# Patient Record
Sex: MALE | Race: WHITE | Employment: PART TIME | URBAN - METROPOLITAN AREA
[De-identification: names, ages, dates, MRNs, and addresses within clinical notes are randomized per-mention and may not be internally consistent; named-entity substitution may affect disease eponyms.]

---

## 2018-11-12 ENCOUNTER — TELEPHONE (OUTPATIENT)
Dept: ADMINISTRATIVE | Age: 33
End: 2018-11-12

## 2024-11-18 NOTE — PROGRESS NOTES
atraumatic.   Eyes:      General:         Right eye: No discharge.         Left eye: No discharge.      Conjunctiva/sclera: Conjunctivae normal.   Neck:      Trachea: No tracheal deviation.   Cardiovascular:      Rate and Rhythm: Normal rate and regular rhythm.      Heart sounds: Normal heart sounds.   Pulmonary:      Effort: Pulmonary effort is normal. No respiratory distress.      Breath sounds: Normal breath sounds. No wheezing.   Abdominal:      General: Bowel sounds are normal. There is no distension.      Palpations: Abdomen is soft.      Tenderness: There is no abdominal tenderness.   Musculoskeletal:         General: No tenderness.      Cervical back: Normal range of motion and neck supple.   Skin:     General: Skin is warm and dry.   Neurological:      Mental Status: He is alert.   Psychiatric:         Behavior: Behavior normal.             Assessment and Plan       1. Generalized abdominal pain  Chronic issue  -Unclear if secondary to previous surgery, however issues started after this, responding to carafate, hycosamine, and pepcid his cousin gave him, will start there, given long standing issue will get repeat imaging and refer to general surgery  - Iglesia Bruner MD, General Surgery, [place]  - CBC  - Comprehensive Metabolic Panel  - TSH  - famotidine (PEPCID) 20 MG tablet; Take 1 tablet by mouth 2 times daily  Dispense: 60 tablet; Refill: 3  - hyoscyamine (LEVSIN) 0.125 MG tablet; Take 1 tablet by mouth every 4 hours as needed for Cramping  Dispense: 30 tablet; Refill: 3  - sucralfate (CARAFATE) 1 GM tablet; Take 1 tablet by mouth 4 times daily  Dispense: 120 tablet; Refill: 3  - CT ABDOMEN PELVIS WO CONTRAST Additional Contrast? None; Future    2. Depressed mood  New issue?  -Secondary to abdominal issues, will treat this first and watch mood, denies any HI or SI   - CBC  - Comprehensive Metabolic Panel  - TSH      I am the primary care provider for this patient and provide the patient

## 2024-11-19 ENCOUNTER — TELEPHONE (OUTPATIENT)
Dept: FAMILY MEDICINE CLINIC | Age: 39
End: 2024-11-19

## 2024-11-19 ENCOUNTER — OFFICE VISIT (OUTPATIENT)
Dept: PRIMARY CARE CLINIC | Age: 39
End: 2024-11-19

## 2024-11-19 VITALS
HEART RATE: 74 BPM | HEIGHT: 76 IN | WEIGHT: 199 LBS | SYSTOLIC BLOOD PRESSURE: 122 MMHG | RESPIRATION RATE: 18 BRPM | OXYGEN SATURATION: 100 % | DIASTOLIC BLOOD PRESSURE: 60 MMHG | BODY MASS INDEX: 24.23 KG/M2 | TEMPERATURE: 97.5 F

## 2024-11-19 DIAGNOSIS — R10.84 GENERALIZED ABDOMINAL PAIN: Primary | ICD-10-CM

## 2024-11-19 DIAGNOSIS — R45.89 DEPRESSED MOOD: ICD-10-CM

## 2024-11-19 LAB
ALBUMIN: 4.3 G/DL (ref 3.5–5.2)
ALP BLD-CCNC: 54 U/L (ref 40–129)
ALT SERPL-CCNC: 17 U/L (ref 0–40)
ANION GAP SERPL CALCULATED.3IONS-SCNC: 10 MMOL/L (ref 7–16)
AST SERPL-CCNC: 17 U/L (ref 0–39)
BILIRUB SERPL-MCNC: 0.3 MG/DL (ref 0–1.2)
BUN BLDV-MCNC: 11 MG/DL (ref 6–20)
CALCIUM SERPL-MCNC: 8.6 MG/DL (ref 8.6–10.2)
CHLORIDE BLD-SCNC: 103 MMOL/L (ref 98–107)
CO2: 24 MMOL/L (ref 22–29)
CREAT SERPL-MCNC: 1.1 MG/DL (ref 0.7–1.2)
GFR, ESTIMATED: >90 ML/MIN/1.73M2
GLUCOSE BLD-MCNC: 90 MG/DL (ref 74–99)
POTASSIUM SERPL-SCNC: 3.7 MMOL/L (ref 3.5–5)
SODIUM BLD-SCNC: 137 MMOL/L (ref 132–146)
TOTAL PROTEIN: 6.6 G/DL (ref 6.4–8.3)
TSH SERPL DL<=0.05 MIU/L-ACNC: 0.8 UIU/ML (ref 0.27–4.2)

## 2024-11-19 RX ORDER — SUCRALFATE 1 G/1
1 TABLET ORAL 4 TIMES DAILY
Qty: 120 TABLET | Refills: 3 | Status: SHIPPED | OUTPATIENT
Start: 2024-11-19

## 2024-11-19 RX ORDER — FAMOTIDINE 20 MG/1
20 TABLET, FILM COATED ORAL 2 TIMES DAILY
Qty: 60 TABLET | Refills: 3 | Status: ON HOLD
Start: 2024-11-19 | End: 2024-11-22 | Stop reason: HOSPADM

## 2024-11-19 RX ORDER — HYOSCYAMINE SULFATE 0.125 MG
0.12 TABLET ORAL EVERY 4 HOURS PRN
Qty: 30 TABLET | Refills: 3 | Status: SHIPPED | OUTPATIENT
Start: 2024-11-19

## 2024-11-19 SDOH — ECONOMIC STABILITY: FOOD INSECURITY: WITHIN THE PAST 12 MONTHS, YOU WORRIED THAT YOUR FOOD WOULD RUN OUT BEFORE YOU GOT MONEY TO BUY MORE.: NEVER TRUE

## 2024-11-19 SDOH — ECONOMIC STABILITY: FOOD INSECURITY: WITHIN THE PAST 12 MONTHS, THE FOOD YOU BOUGHT JUST DIDN'T LAST AND YOU DIDN'T HAVE MONEY TO GET MORE.: NEVER TRUE

## 2024-11-19 SDOH — HEALTH STABILITY: PHYSICAL HEALTH: ON AVERAGE, HOW MANY DAYS PER WEEK DO YOU ENGAGE IN MODERATE TO STRENUOUS EXERCISE (LIKE A BRISK WALK)?: 5 DAYS

## 2024-11-19 SDOH — ECONOMIC STABILITY: INCOME INSECURITY: HOW HARD IS IT FOR YOU TO PAY FOR THE VERY BASICS LIKE FOOD, HOUSING, MEDICAL CARE, AND HEATING?: NOT HARD AT ALL

## 2024-11-19 ASSESSMENT — PATIENT HEALTH QUESTIONNAIRE - PHQ9
4. FEELING TIRED OR HAVING LITTLE ENERGY: NEARLY EVERY DAY
SUM OF ALL RESPONSES TO PHQ QUESTIONS 1-9: 12
8. MOVING OR SPEAKING SO SLOWLY THAT OTHER PEOPLE COULD HAVE NOTICED. OR THE OPPOSITE, BEING SO FIGETY OR RESTLESS THAT YOU HAVE BEEN MOVING AROUND A LOT MORE THAN USUAL: NOT AT ALL
9. THOUGHTS THAT YOU WOULD BE BETTER OFF DEAD, OR OF HURTING YOURSELF: NOT AT ALL
1. LITTLE INTEREST OR PLEASURE IN DOING THINGS: NEARLY EVERY DAY
5. POOR APPETITE OR OVEREATING: NOT AT ALL
3. TROUBLE FALLING OR STAYING ASLEEP: NEARLY EVERY DAY
SUM OF ALL RESPONSES TO PHQ9 QUESTIONS 1 & 2: 4
6. FEELING BAD ABOUT YOURSELF - OR THAT YOU ARE A FAILURE OR HAVE LET YOURSELF OR YOUR FAMILY DOWN: MORE THAN HALF THE DAYS
SUM OF ALL RESPONSES TO PHQ QUESTIONS 1-9: 12
7. TROUBLE CONCENTRATING ON THINGS, SUCH AS READING THE NEWSPAPER OR WATCHING TELEVISION: NOT AT ALL
SUM OF ALL RESPONSES TO PHQ QUESTIONS 1-9: 12
SUM OF ALL RESPONSES TO PHQ QUESTIONS 1-9: 12
2. FEELING DOWN, DEPRESSED OR HOPELESS: SEVERAL DAYS
10. IF YOU CHECKED OFF ANY PROBLEMS, HOW DIFFICULT HAVE THESE PROBLEMS MADE IT FOR YOU TO DO YOUR WORK, TAKE CARE OF THINGS AT HOME, OR GET ALONG WITH OTHER PEOPLE: NOT DIFFICULT AT ALL

## 2024-11-20 ENCOUNTER — HOSPITAL ENCOUNTER (INPATIENT)
Age: 39
LOS: 2 days | Discharge: HOME | DRG: 663 | End: 2024-11-22
Attending: EMERGENCY MEDICINE | Admitting: FAMILY MEDICINE
Payer: COMMERCIAL

## 2024-11-20 ENCOUNTER — APPOINTMENT (OUTPATIENT)
Dept: CT IMAGING | Age: 39
DRG: 663 | End: 2024-11-20
Payer: COMMERCIAL

## 2024-11-20 DIAGNOSIS — K92.2 ACUTE UPPER GI BLEED: ICD-10-CM

## 2024-11-20 DIAGNOSIS — D64.9 ANEMIA, UNSPECIFIED TYPE: Primary | ICD-10-CM

## 2024-11-20 PROBLEM — D62 ACUTE BLOOD LOSS ANEMIA (ABLA): Status: ACTIVE | Noted: 2024-11-20

## 2024-11-20 LAB
ALBUMIN SERPL-MCNC: 4.5 G/DL (ref 3.5–5.2)
ALP SERPL-CCNC: 58 U/L (ref 40–129)
ALT SERPL-CCNC: 18 U/L (ref 0–40)
ANION GAP SERPL CALCULATED.3IONS-SCNC: 7 MMOL/L (ref 7–16)
AST SERPL-CCNC: 20 U/L (ref 0–39)
ATYPICAL LYMPHOCYTE ABSOLUTE COUNT: 0.03 K/UL (ref 0–0.46)
ATYPICAL LYMPHOCYTES: 1 % (ref 0–4)
BASOPHILS # BLD: 0.11 K/UL (ref 0–0.2)
BASOPHILS NFR BLD: 4 % (ref 0–2)
BILIRUB SERPL-MCNC: 0.3 MG/DL (ref 0–1.2)
BUN SERPL-MCNC: 9 MG/DL (ref 6–20)
CALCIUM SERPL-MCNC: 8.7 MG/DL (ref 8.6–10.2)
CHLORIDE SERPL-SCNC: 103 MMOL/L (ref 98–107)
CO2 SERPL-SCNC: 25 MMOL/L (ref 22–29)
CREAT SERPL-MCNC: 0.9 MG/DL (ref 0.7–1.2)
EKG ATRIAL RATE: 64 BPM
EKG P AXIS: 54 DEGREES
EKG P-R INTERVAL: 150 MS
EKG Q-T INTERVAL: 390 MS
EKG QRS DURATION: 98 MS
EKG QTC CALCULATION (BAZETT): 402 MS
EKG R AXIS: 75 DEGREES
EKG T AXIS: 70 DEGREES
EKG VENTRICULAR RATE: 64 BPM
EOSINOPHIL # BLD: 0.08 K/UL (ref 0.05–0.5)
EOSINOPHILS RELATIVE PERCENT: 3 % (ref 0–6)
ERYTHROCYTE [DISTWIDTH] IN BLOOD BY AUTOMATED COUNT: 21 % (ref 11.5–15)
FOLATE SERPL-MCNC: >20 NG/ML (ref 4.8–24.2)
GFR, ESTIMATED: >90 ML/MIN/1.73M2
GLUCOSE SERPL-MCNC: 93 MG/DL (ref 74–99)
HCT VFR BLD AUTO: 16.1 % (ref 37–54)
HCT VFR BLD AUTO: 24 % (ref 37–54)
HCT VFR BLD CALC: 16 % (ref 37–54)
HEMOGLOBIN: 3.6 G/DL (ref 12.5–16.5)
HGB BLD-MCNC: 3.8 G/DL (ref 12.5–16.5)
HGB BLD-MCNC: 6.8 G/DL (ref 12.5–16.5)
LACTATE BLDV-SCNC: 0.9 MMOL/L (ref 0.5–2.2)
LIPASE SERPL-CCNC: 11 U/L (ref 13–60)
LYMPHOCYTES NFR BLD: 0.66 K/UL (ref 1.5–4)
LYMPHOCYTES RELATIVE PERCENT: 22 % (ref 20–42)
MAGNESIUM SERPL-MCNC: 2 MG/DL (ref 1.6–2.6)
MCH RBC QN AUTO: 12.8 PG (ref 26–35)
MCH RBC QN AUTO: 13 PG (ref 26–35)
MCHC RBC AUTO-ENTMCNC: 22.5 G/DL (ref 32–34.5)
MCHC RBC AUTO-ENTMCNC: 23.6 G/DL (ref 32–34.5)
MCV RBC AUTO: 55.1 FL (ref 80–99.9)
MCV RBC AUTO: 56.9 FL (ref 80–99.9)
MONOCYTES NFR BLD: 0.08 K/UL (ref 0.1–0.95)
MONOCYTES NFR BLD: 3 % (ref 2–12)
NEUTROPHILS NFR BLD: 68 % (ref 43–80)
NEUTS SEG NFR BLD: 2.05 K/UL (ref 1.8–7.3)
NUCLEATED RED BLOOD CELLS: 1 PER 100 WBC
PDW BLD-RTO: 21.2 % (ref 11.5–15)
PLATELET # BLD: 246 K/UL (ref 130–450)
PLATELET CONFIRMATION: NORMAL
PLATELET, FLUORESCENCE: 256 K/UL (ref 130–450)
PMV BLD AUTO: ABNORMAL FL (ref 7–12)
PMV BLD AUTO: ABNORMAL FL (ref 7–12)
POTASSIUM SERPL-SCNC: 3.9 MMOL/L (ref 3.5–5)
PROT SERPL-MCNC: 6.4 G/DL (ref 6.4–8.3)
RBC # BLD AUTO: 2.92 M/UL (ref 3.8–5.8)
RBC # BLD: 2.81 M/UL (ref 3.8–5.8)
RBC # BLD: ABNORMAL 10*6/UL
SODIUM SERPL-SCNC: 135 MMOL/L (ref 132–146)
TROPONIN I SERPL HS-MCNC: <6 NG/L (ref 0–11)
TROPONIN I SERPL HS-MCNC: <6 NG/L (ref 0–11)
TSH SERPL DL<=0.05 MIU/L-ACNC: 1.3 UIU/ML (ref 0.27–4.2)
VIT B12 SERPL-MCNC: 293 PG/ML (ref 211–946)
WBC # BLD: 4.3 K/UL (ref 4.5–11.5)
WBC OTHER # BLD: 3 K/UL (ref 4.5–11.5)

## 2024-11-20 PROCEDURE — 84484 ASSAY OF TROPONIN QUANT: CPT

## 2024-11-20 PROCEDURE — 80053 COMPREHEN METABOLIC PANEL: CPT

## 2024-11-20 PROCEDURE — 85018 HEMOGLOBIN: CPT

## 2024-11-20 PROCEDURE — 6360000004 HC RX CONTRAST MEDICATION: Performed by: RADIOLOGY

## 2024-11-20 PROCEDURE — 84443 ASSAY THYROID STIM HORMONE: CPT

## 2024-11-20 PROCEDURE — 93010 ELECTROCARDIOGRAM REPORT: CPT | Performed by: INTERNAL MEDICINE

## 2024-11-20 PROCEDURE — 96374 THER/PROPH/DIAG INJ IV PUSH: CPT

## 2024-11-20 PROCEDURE — 82607 VITAMIN B-12: CPT

## 2024-11-20 PROCEDURE — 83540 ASSAY OF IRON: CPT

## 2024-11-20 PROCEDURE — 2580000003 HC RX 258

## 2024-11-20 PROCEDURE — 83605 ASSAY OF LACTIC ACID: CPT

## 2024-11-20 PROCEDURE — P9016 RBC LEUKOCYTES REDUCED: HCPCS

## 2024-11-20 PROCEDURE — 93005 ELECTROCARDIOGRAM TRACING: CPT

## 2024-11-20 PROCEDURE — 99285 EMERGENCY DEPT VISIT HI MDM: CPT

## 2024-11-20 PROCEDURE — 83550 IRON BINDING TEST: CPT

## 2024-11-20 PROCEDURE — 85025 COMPLETE CBC W/AUTO DIFF WBC: CPT

## 2024-11-20 PROCEDURE — 2060000000 HC ICU INTERMEDIATE R&B

## 2024-11-20 PROCEDURE — 82746 ASSAY OF FOLIC ACID SERUM: CPT

## 2024-11-20 PROCEDURE — 36430 TRANSFUSION BLD/BLD COMPNT: CPT

## 2024-11-20 PROCEDURE — 82728 ASSAY OF FERRITIN: CPT

## 2024-11-20 PROCEDURE — 85014 HEMATOCRIT: CPT

## 2024-11-20 PROCEDURE — 83690 ASSAY OF LIPASE: CPT

## 2024-11-20 PROCEDURE — 6360000002 HC RX W HCPCS

## 2024-11-20 PROCEDURE — 6360000002 HC RX W HCPCS: Performed by: EMERGENCY MEDICINE

## 2024-11-20 PROCEDURE — 74174 CTA ABD&PLVS W/CONTRAST: CPT

## 2024-11-20 PROCEDURE — 96375 TX/PRO/DX INJ NEW DRUG ADDON: CPT

## 2024-11-20 PROCEDURE — 86923 COMPATIBILITY TEST ELECTRIC: CPT

## 2024-11-20 PROCEDURE — 86901 BLOOD TYPING SEROLOGIC RH(D): CPT

## 2024-11-20 PROCEDURE — 30233N1 TRANSFUSION OF NONAUTOLOGOUS RED BLOOD CELLS INTO PERIPHERAL VEIN, PERCUTANEOUS APPROACH: ICD-10-PCS | Performed by: STUDENT IN AN ORGANIZED HEALTH CARE EDUCATION/TRAINING PROGRAM

## 2024-11-20 PROCEDURE — 86900 BLOOD TYPING SEROLOGIC ABO: CPT

## 2024-11-20 PROCEDURE — 83735 ASSAY OF MAGNESIUM: CPT

## 2024-11-20 PROCEDURE — 86850 RBC ANTIBODY SCREEN: CPT

## 2024-11-20 RX ORDER — SODIUM CHLORIDE 0.9 % (FLUSH) 0.9 %
5-40 SYRINGE (ML) INJECTION PRN
Status: DISCONTINUED | OUTPATIENT
Start: 2024-11-20 | End: 2024-11-22 | Stop reason: HOSPADM

## 2024-11-20 RX ORDER — IOPAMIDOL 755 MG/ML
75 INJECTION, SOLUTION INTRAVASCULAR
Status: COMPLETED | OUTPATIENT
Start: 2024-11-20 | End: 2024-11-20

## 2024-11-20 RX ORDER — SODIUM CHLORIDE 9 MG/ML
INJECTION, SOLUTION INTRAVENOUS PRN
Status: COMPLETED | OUTPATIENT
Start: 2024-11-20 | End: 2024-11-20

## 2024-11-20 RX ORDER — PANTOPRAZOLE SODIUM 40 MG/10ML
40 INJECTION, POWDER, LYOPHILIZED, FOR SOLUTION INTRAVENOUS 2 TIMES DAILY
Status: DISCONTINUED | OUTPATIENT
Start: 2024-11-20 | End: 2024-11-22 | Stop reason: HOSPADM

## 2024-11-20 RX ORDER — FUROSEMIDE 10 MG/ML
20 INJECTION INTRAMUSCULAR; INTRAVENOUS ONCE
Status: COMPLETED | OUTPATIENT
Start: 2024-11-20 | End: 2024-11-20

## 2024-11-20 RX ORDER — CALCIUM GLUCONATE 94 MG/ML
1000 INJECTION, SOLUTION INTRAVENOUS ONCE
Status: COMPLETED | OUTPATIENT
Start: 2024-11-20 | End: 2024-11-20

## 2024-11-20 RX ORDER — SODIUM CHLORIDE 9 MG/ML
INJECTION, SOLUTION INTRAVENOUS PRN
Status: DISCONTINUED | OUTPATIENT
Start: 2024-11-20 | End: 2024-11-22 | Stop reason: HOSPADM

## 2024-11-20 RX ORDER — ACETAMINOPHEN 325 MG/1
650 TABLET ORAL EVERY 6 HOURS PRN
Status: DISCONTINUED | OUTPATIENT
Start: 2024-11-20 | End: 2024-11-22 | Stop reason: HOSPADM

## 2024-11-20 RX ORDER — POLYETHYLENE GLYCOL 3350 17 G/17G
17 POWDER, FOR SOLUTION ORAL DAILY PRN
Status: DISCONTINUED | OUTPATIENT
Start: 2024-11-20 | End: 2024-11-22 | Stop reason: HOSPADM

## 2024-11-20 RX ORDER — ONDANSETRON 2 MG/ML
4 INJECTION INTRAMUSCULAR; INTRAVENOUS EVERY 6 HOURS PRN
Status: DISCONTINUED | OUTPATIENT
Start: 2024-11-20 | End: 2024-11-22 | Stop reason: HOSPADM

## 2024-11-20 RX ORDER — ONDANSETRON 4 MG/1
4 TABLET, ORALLY DISINTEGRATING ORAL EVERY 8 HOURS PRN
Status: DISCONTINUED | OUTPATIENT
Start: 2024-11-20 | End: 2024-11-22 | Stop reason: HOSPADM

## 2024-11-20 RX ORDER — ACETAMINOPHEN 650 MG/1
650 SUPPOSITORY RECTAL EVERY 6 HOURS PRN
Status: DISCONTINUED | OUTPATIENT
Start: 2024-11-20 | End: 2024-11-22 | Stop reason: HOSPADM

## 2024-11-20 RX ORDER — SODIUM CHLORIDE 0.9 % (FLUSH) 0.9 %
5-40 SYRINGE (ML) INJECTION EVERY 12 HOURS SCHEDULED
Status: DISCONTINUED | OUTPATIENT
Start: 2024-11-20 | End: 2024-11-22 | Stop reason: HOSPADM

## 2024-11-20 RX ORDER — PANTOPRAZOLE SODIUM 40 MG/10ML
40 INJECTION, POWDER, LYOPHILIZED, FOR SOLUTION INTRAVENOUS ONCE
Status: COMPLETED | OUTPATIENT
Start: 2024-11-20 | End: 2024-11-20

## 2024-11-20 RX ADMIN — SODIUM CHLORIDE: 9 INJECTION, SOLUTION INTRAVENOUS at 11:22

## 2024-11-20 RX ADMIN — PANTOPRAZOLE SODIUM 40 MG: 40 INJECTION, POWDER, FOR SOLUTION INTRAVENOUS at 14:40

## 2024-11-20 RX ADMIN — IOPAMIDOL 75 ML: 755 INJECTION, SOLUTION INTRAVENOUS at 10:49

## 2024-11-20 RX ADMIN — SODIUM CHLORIDE, PRESERVATIVE FREE 10 ML: 5 INJECTION INTRAVENOUS at 21:57

## 2024-11-20 RX ADMIN — CALCIUM GLUCONATE 1000 MG: 98 INJECTION, SOLUTION INTRAVENOUS at 14:38

## 2024-11-20 RX ADMIN — PANTOPRAZOLE SODIUM 40 MG: 40 INJECTION, POWDER, FOR SOLUTION INTRAVENOUS at 21:57

## 2024-11-20 RX ADMIN — FUROSEMIDE 20 MG: 10 INJECTION, SOLUTION INTRAMUSCULAR; INTRAVENOUS at 17:42

## 2024-11-20 ASSESSMENT — ENCOUNTER SYMPTOMS
VOMITING: 0
DIARRHEA: 1
CONSTIPATION: 0
ABDOMINAL DISTENTION: 0
ABDOMINAL PAIN: 1
APNEA: 0
COUGH: 0
SHORTNESS OF BREATH: 1
NAUSEA: 0

## 2024-11-20 ASSESSMENT — PAIN - FUNCTIONAL ASSESSMENT
PAIN_FUNCTIONAL_ASSESSMENT: NONE - DENIES PAIN
PAIN_FUNCTIONAL_ASSESSMENT: ACTIVITIES ARE NOT PREVENTED
PAIN_FUNCTIONAL_ASSESSMENT: 0-10

## 2024-11-20 ASSESSMENT — PAIN DESCRIPTION - FREQUENCY: FREQUENCY: CONTINUOUS

## 2024-11-20 ASSESSMENT — PAIN DESCRIPTION - DESCRIPTORS: DESCRIPTORS: ACHING

## 2024-11-20 ASSESSMENT — PAIN DESCRIPTION - LOCATION: LOCATION: ABDOMEN

## 2024-11-20 ASSESSMENT — PAIN SCALES - GENERAL: PAINLEVEL_OUTOF10: 8

## 2024-11-20 ASSESSMENT — PAIN DESCRIPTION - ORIENTATION: ORIENTATION: RIGHT

## 2024-11-20 ASSESSMENT — PAIN DESCRIPTION - PAIN TYPE: TYPE: CHRONIC PAIN

## 2024-11-20 NOTE — ED NOTES
The following labs labeled with pt sticker and tubed to lab:     [] Blue     [x] Lavender   [] on ice  [x] Green/yellow  [] Green/black [] on ice  [] Yellow  [] Red  [] Pink      [] COVID-19 swab    [] Rapid  [] PCR  [] Peds Viral Panel     [] Urine Sample  [] Pelvic Cultures  [] Blood Cultures

## 2024-11-20 NOTE — H&P
[facility]  Resident History and Physical      CHIEF COMPLAINT:    Chief Complaint   Patient presents with    hgb 3.6     Called by GI last night for labs drawn yesterday.        History of Present Illness:   Ahmet Rodriguez  is a 39 y.o. 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    Patient symptoms started for the past 6 months with exertional SOB, dizziness, lightheadedness, he also stated he has experienced some left side chest pressure 5/10 most of the time the worst pain was about 9/10, radiates to left side of his neck behind his ear, and 1 time it went to his back to, chest pain is randomly, it lasts under a minute, it does not interrupt his sleep, hasn't woke up patient at night.  Patient also experienced some abdominal tenderness, ongoing, no triggers no alleviators.  Patient is states that he has been experience chronic diarrhea since he has had his surgery on 2015, daily hematochezia, no melena. Patient also complaining of ongoing palpitations since he was a child.   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    Denied any nausea, vomiting, no nausea no vomiting,    Patient still smoking tobacco smoking 8 year ago. Now vapping.  Not interested in quitting as well.  He smokes everyday marihuana for sleep and increased appetite.     Pt has taken OTC Tylenol sporadically to alleviate his symptoms.     Patient stated he has not seek any medical attention since 2015 because he did not have any medical insurance and now he finally has insurance that is why he is scheduled an appointment with his PCP     Total Bilirubin 0.3 0.0 - 1.2 mg/dL    Alkaline Phosphatase 58 40 - 129 U/L    ALT 18 0 - 40 U/L    AST 20 0 - 39 U/L   Lipase    Collection Time: 11/20/24  9:44 AM   Result Value Ref Range    Lipase 11 (L) 13 - 60 U/L   Lactic Acid    Collection Time: 11/20/24  9:44 AM   Result Value Ref Range    Lactic Acid 0.9 0.5 - 2.2 mmol/L   Magnesium    Collection Time: 11/20/24  9:44 AM   Result Value Ref Range    Magnesium 2.0 1.6 - 2.6 mg/dL   Vitamin B12 & Folate    Collection Time: 11/20/24  9:44 AM   Result Value Ref Range    Vitamin B-12 293 211 - 946 pg/mL    Folate >20.0 4.8 - 24.2 ng/mL   Troponin    Collection Time: 11/20/24  9:44 AM   Result Value Ref Range    Troponin, High Sensitivity <6 0 - 11 ng/L   TYPE AND SCREEN    Collection Time: 11/20/24  9:51 AM   Result Value Ref Range    Blood Bank Sample Expiration 11/23/2024,2359     Arm Band Number [id number]     ABO/Rh A NEGATIVE     Antibody Screen NEGATIVE     Unit Number [id number]     Component Leukocyte Reduced Red Cell     Unit Divison 00     Dispense Status Blood Bank ISSUED     Unit Issue Date/Time 936860718474     Product Code Blood Bank [id number]     Blood Bank Unit Type and Rh A NEG     Blood Bank ISBT Product Blood Type 0600     Blood Bank Blood Product Expiration Date 202412022359     Transfusion Status OK TO TRANSFUSE     Crossmatch Result COMPATIBLE     Unit Number [id number]     Component Leukocyte Reduced Red Cell     Unit Divison 00     Dispense Status Blood Bank ISSUED     Unit Issue Date/Time 251205199513     Product Code Blood Bank [id number]     Blood Bank Unit Type and Rh A NEG     Blood Bank ISBT Product Blood Type 0600     Blood Bank Blood Product Expiration Date 202412052359     Transfusion Status OK TO TRANSFUSE     Crossmatch Result COMPATIBLE     Unit Number [id number]     Component Leukocyte Reduced Red Cell     Unit Divison 00     Dispense Status Blood Bank ALLOCATED     Transfusion Status OK TO TRANSFUSE     Crossmatch Result

## 2024-11-20 NOTE — PROGRESS NOTES
Called patient this evening after receiving a critical lab result from [facility] laboratory. Hgb 3.6. Could not reach patient directly, but I did leave a voicemail on patient’s phone regarding low Hgb and the need to go to ER ASAP for blood transfusion and further evaluation.     I will also make sure to let patient’s PCP, Dr Venegas, know of this result.

## 2024-11-20 NOTE — TELEPHONE ENCOUNTER
Called patient this evening after receiving a critical lab result from [facility] laboratory. Hgb 3.6. Could not reach patient directly, but I did leave a voicemail on patient’s phone regarding low Hgb and the need to go to ER ASAP for blood transfusion and further evaluation.      I will also make sure to let patient’s PCP, Dr Venegas, know of this result.

## 2024-11-20 NOTE — ED NOTES
Report given to Grant PALAFOX RN from Charisse RN   Report method by phone   The following was reviewed with receiving RN:   Current vital signs:  /83   Pulse 73   Temp 98 °F (36.7 °C)   Resp 17   Ht 1.93 m (6' 4\")   Wt 90.3 kg (199 lb)   SpO2 99%   BMI 24.22 kg/m²                MEWS Score: 1     Any medication or safety alerts were reviewed. Any pending diagnostics and notifications were also reviewed, as well as any safety concerns or issues, abnormal labs, abnormal imaging, and abnormal assessment findings. Questions were answered.

## 2024-11-20 NOTE — ED PROVIDER NOTES
[facility] EMERGENCY DEPARTMENT  EMERGENCY DEPARTMENT ENCOUNTER        Pt Name: Ahmet Rodriguez  MRN: [id number]  Birthdate 1985  Date of evaluation: 11/20/2024  Provider: Oh Baird DO  PCP: Percy Venegas DO  Note Started: 9:05 AM EST 11/20/24    CHIEF COMPLAINT       Chief Complaint   Patient presents with    hgb 3.6     Called by GI last night for labs drawn yesterday.       HISTORY OF PRESENT ILLNESS: 1 or more Elements   History From: patient    Limitations to history : None    Ahmet Rodriguez is a 39 y.o. male who presents with low hemoglobin.  Patient saw his GI doctor, Dr. Venegas yesterday and had his labs drawn.  He was called and told to come to the ER because his hemoglobin was 3.6.  The patient notes that he had multiple fetal bowel removed in 2009 after ischemic event.  He has had chronic lower abdominal pain since then.  He notes that his abdominal pain currently is similar to his chronic pain.  He has had blood in his bowel movements since the surgery in 2009.  However blood in his stools have been particularly worse in the last few weeks.    Patient denies fever, chills, headache, shortness of breath, chest pain, nausea, vomiting, diarrhea, lightheadedness, dysuria, hematuria.    Nursing Notes were all reviewed and agreed with or any disagreements were addressed in the HPI.        REVIEW OF SYSTEMS :           Positives and Pertinent negatives as per HPI.     SURGICAL HISTORY     Past Surgical History:   Procedure Laterality Date    APPENDECTOMY      COLON SURGERY  2009    Dr Sheriff       CURRENTMEDICATIONS       Previous Medications    FAMOTIDINE (PEPCID) 20 MG TABLET    Take 1 tablet by mouth 2 times daily    HYOSCYAMINE (LEVSIN) 0.125 MG TABLET    Take 1 tablet by mouth every 4 hours as needed for Cramping    SUCRALFATE (CARAFATE) 1 GM TABLET    Take 1 tablet by mouth 4 times daily       ALLERGIES     Bee venom and Poison oak  HISTORY/Chronic Conditions Affecting Care      has a past medical history of Chicken pox (1993), Hematochezia, History of coagulopathy (6/4/2014), and SBO (6/4/2014).     EMERGENCY DEPARTMENT COURSE    Vitals:    Vitals:    11/20/24 1327 11/20/24 1330 11/20/24 1345 11/20/24 1500   BP: 119/62 119/62 125/61 124/71   Pulse: 78 80 85 75   Resp: 16 18 22 16   Temp: 98.3 °F (36.8 °C)  97.9 °F (36.6 °C) 98 °F (36.7 °C)   TempSrc: Oral  Oral Oral   SpO2: 98% 99% 99% 99%   Weight:       Height:           Patient was given the following medications:  Medications   sodium chloride flush 0.9 % injection 5-40 mL (has no administration in time range)   sodium chloride flush 0.9 % injection 5-40 mL (has no administration in time range)   0.9 % sodium chloride infusion (has no administration in time range)   ondansetron (ZOFRAN-ODT) disintegrating tablet 4 mg (has no administration in time range)     Or   ondansetron (ZOFRAN) injection 4 mg (has no administration in time range)   polyethylene glycol (GLYCOLAX) packet 17 g (has no administration in time range)   acetaminophen (TYLENOL) tablet 650 mg (has no administration in time range)     Or   acetaminophen (TYLENOL) suppository 650 mg (has no administration in time range)   furosemide (LASIX) injection 20 mg (has no administration in time range)   pantoprazole (PROTONIX) injection 40 mg (has no administration in time range)   0.9 % sodium chloride infusion (has no administration in time range)   0.9 % sodium chloride infusion ( IntraVENous New Bag 11/20/24 1122)   iopamidol (ISOVUE-370) 76 % injection 75 mL (75 mLs IntraVENous Given 11/20/24 1049)   pantoprazole (PROTONIX) injection 40 mg (40 mg IntraVENous Given 11/20/24 1440)   calcium gluconate 10 % injection 1,000 mg (1,000 mg IntraVENous Given 11/20/24 1438)           Is this patient to be included in the SEP-1 Core Measure due to severe sepsis or septic shock?   No   Exclusion criteria - the patient is NOT to be included

## 2024-11-21 ENCOUNTER — ANESTHESIA (OUTPATIENT)
Dept: ENDOSCOPY | Age: 39
End: 2024-11-21
Payer: COMMERCIAL

## 2024-11-21 ENCOUNTER — ANESTHESIA EVENT (OUTPATIENT)
Dept: ENDOSCOPY | Age: 39
End: 2024-11-21
Payer: COMMERCIAL

## 2024-11-21 PROBLEM — K92.2 ACUTE UPPER GI BLEED: Status: ACTIVE | Noted: 2024-11-20

## 2024-11-21 LAB
ABO/RH: NORMAL
ALBUMIN SERPL-MCNC: 4.5 G/DL (ref 3.5–5.2)
ALP SERPL-CCNC: 64 U/L (ref 40–129)
ALT SERPL-CCNC: 16 U/L (ref 0–40)
ANION GAP SERPL CALCULATED.3IONS-SCNC: 9 MMOL/L (ref 7–16)
ANTIBODY SCREEN: NEGATIVE
ARM BAND NUMBER: NORMAL
AST SERPL-CCNC: 19 U/L (ref 0–39)
BASOPHILS # BLD: 0.05 K/UL (ref 0–0.2)
BASOPHILS NFR BLD: 1 % (ref 0–2)
BILIRUB SERPL-MCNC: 1.1 MG/DL (ref 0–1.2)
BLOOD BANK BLOOD PRODUCT EXPIRATION DATE: NORMAL
BLOOD BANK DISPENSE STATUS: NORMAL
BLOOD BANK ISBT PRODUCT BLOOD TYPE: 600
BLOOD BANK PRODUCT CODE: NORMAL
BLOOD BANK SAMPLE EXPIRATION: NORMAL
BLOOD BANK UNIT TYPE AND RH: NORMAL
BPU ID: NORMAL
BUN SERPL-MCNC: 10 MG/DL (ref 6–20)
CALCIUM SERPL-MCNC: 10 MG/DL (ref 8.6–10.2)
CHLORIDE SERPL-SCNC: 103 MMOL/L (ref 98–107)
CO2 SERPL-SCNC: 23 MMOL/L (ref 22–29)
COMPONENT: NORMAL
CREAT SERPL-MCNC: 1 MG/DL (ref 0.7–1.2)
CROSSMATCH RESULT: NORMAL
EOSINOPHIL # BLD: 0.05 K/UL (ref 0.05–0.5)
EOSINOPHILS RELATIVE PERCENT: 1 % (ref 0–6)
ERYTHROCYTE [DISTWIDTH] IN BLOOD BY AUTOMATED COUNT: 30.5 % (ref 11.5–15)
GFR, ESTIMATED: >90 ML/MIN/1.73M2
GLUCOSE SERPL-MCNC: 93 MG/DL (ref 74–99)
HCT VFR BLD AUTO: 26.8 % (ref 37–54)
HGB BLD-MCNC: 7.7 G/DL (ref 12.5–16.5)
LYMPHOCYTES NFR BLD: 1.12 K/UL (ref 1.5–4)
LYMPHOCYTES RELATIVE PERCENT: 19 % (ref 20–42)
MCH RBC QN AUTO: 18.1 PG (ref 26–35)
MCHC RBC AUTO-ENTMCNC: 28.7 G/DL (ref 32–34.5)
MCV RBC AUTO: 63.1 FL (ref 80–99.9)
MONOCYTES NFR BLD: 0.41 K/UL (ref 0.1–0.95)
MONOCYTES NFR BLD: 7 % (ref 2–12)
NEUTROPHILS NFR BLD: 72 % (ref 43–80)
NEUTS SEG NFR BLD: 4.17 K/UL (ref 1.8–7.3)
NUCLEATED RED BLOOD CELLS: 1 PER 100 WBC
PLATELET, FLUORESCENCE: 267 K/UL (ref 130–450)
PMV BLD AUTO: ABNORMAL FL (ref 7–12)
POTASSIUM SERPL-SCNC: 4 MMOL/L (ref 3.5–5)
PROT SERPL-MCNC: 7 G/DL (ref 6.4–8.3)
RBC # BLD AUTO: 4.25 M/UL (ref 3.8–5.8)
RBC # BLD: ABNORMAL 10*6/UL
SODIUM SERPL-SCNC: 135 MMOL/L (ref 132–146)
TRANSFUSION STATUS: NORMAL
TROPONIN I SERPL HS-MCNC: <6 NG/L (ref 0–11)
UNIT DIVISION: 0
UNIT ISSUE DATE/TIME: NORMAL
WBC OTHER # BLD: 5.8 K/UL (ref 4.5–11.5)

## 2024-11-21 PROCEDURE — 2709999900 HC NON-CHARGEABLE SUPPLY: Performed by: STUDENT IN AN ORGANIZED HEALTH CARE EDUCATION/TRAINING PROGRAM

## 2024-11-21 PROCEDURE — 2580000003 HC RX 258

## 2024-11-21 PROCEDURE — 6360000002 HC RX W HCPCS

## 2024-11-21 PROCEDURE — 80053 COMPREHEN METABOLIC PANEL: CPT

## 2024-11-21 PROCEDURE — 3609012400 HC EGD TRANSORAL BIOPSY SINGLE/MULTIPLE: Performed by: STUDENT IN AN ORGANIZED HEALTH CARE EDUCATION/TRAINING PROGRAM

## 2024-11-21 PROCEDURE — 0DB68ZX EXCISION OF STOMACH, VIA NATURAL OR ARTIFICIAL OPENING ENDOSCOPIC, DIAGNOSTIC: ICD-10-PCS | Performed by: STUDENT IN AN ORGANIZED HEALTH CARE EDUCATION/TRAINING PROGRAM

## 2024-11-21 PROCEDURE — 7100000011 HC PHASE II RECOVERY - ADDTL 15 MIN: Performed by: STUDENT IN AN ORGANIZED HEALTH CARE EDUCATION/TRAINING PROGRAM

## 2024-11-21 PROCEDURE — 85025 COMPLETE CBC W/AUTO DIFF WBC: CPT

## 2024-11-21 PROCEDURE — 3700000000 HC ANESTHESIA ATTENDED CARE: Performed by: STUDENT IN AN ORGANIZED HEALTH CARE EDUCATION/TRAINING PROGRAM

## 2024-11-21 PROCEDURE — 7100000010 HC PHASE II RECOVERY - FIRST 15 MIN: Performed by: STUDENT IN AN ORGANIZED HEALTH CARE EDUCATION/TRAINING PROGRAM

## 2024-11-21 PROCEDURE — 3700000001 HC ADD 15 MINUTES (ANESTHESIA): Performed by: STUDENT IN AN ORGANIZED HEALTH CARE EDUCATION/TRAINING PROGRAM

## 2024-11-21 PROCEDURE — 88305 TISSUE EXAM BY PATHOLOGIST: CPT

## 2024-11-21 PROCEDURE — 2060000000 HC ICU INTERMEDIATE R&B

## 2024-11-21 PROCEDURE — 84484 ASSAY OF TROPONIN QUANT: CPT

## 2024-11-21 PROCEDURE — 99222 1ST HOSP IP/OBS MODERATE 55: CPT | Performed by: FAMILY MEDICINE

## 2024-11-21 PROCEDURE — 6370000000 HC RX 637 (ALT 250 FOR IP)

## 2024-11-21 RX ORDER — PROPOFOL 10 MG/ML
INJECTION, EMULSION INTRAVENOUS
Status: DISCONTINUED | OUTPATIENT
Start: 2024-11-21 | End: 2024-11-21 | Stop reason: SDUPTHER

## 2024-11-21 RX ADMIN — ACETAMINOPHEN 650 MG: 325 TABLET ORAL at 19:27

## 2024-11-21 RX ADMIN — PANTOPRAZOLE SODIUM 40 MG: 40 INJECTION, POWDER, FOR SOLUTION INTRAVENOUS at 10:18

## 2024-11-21 RX ADMIN — SODIUM CHLORIDE: 9 INJECTION, SOLUTION INTRAVENOUS at 14:12

## 2024-11-21 RX ADMIN — PANTOPRAZOLE SODIUM 40 MG: 40 INJECTION, POWDER, FOR SOLUTION INTRAVENOUS at 19:28

## 2024-11-21 RX ADMIN — SODIUM CHLORIDE, PRESERVATIVE FREE 10 ML: 5 INJECTION INTRAVENOUS at 10:18

## 2024-11-21 RX ADMIN — PROPOFOL 300 MG: 10 INJECTION, EMULSION INTRAVENOUS at 14:14

## 2024-11-21 RX ADMIN — SODIUM CHLORIDE, PRESERVATIVE FREE 10 ML: 5 INJECTION INTRAVENOUS at 19:27

## 2024-11-21 ASSESSMENT — ENCOUNTER SYMPTOMS
RESPIRATORY NEGATIVE: 1
GASTROINTESTINAL NEGATIVE: 1
ALLERGIC/IMMUNOLOGIC NEGATIVE: 1
EYES NEGATIVE: 1

## 2024-11-21 ASSESSMENT — PAIN DESCRIPTION - LOCATION: LOCATION: HEAD

## 2024-11-21 ASSESSMENT — PAIN SCALES - GENERAL: PAINLEVEL_OUTOF10: 4

## 2024-11-21 ASSESSMENT — PAIN DESCRIPTION - DESCRIPTORS: DESCRIPTORS: ACHING

## 2024-11-21 ASSESSMENT — PAIN - FUNCTIONAL ASSESSMENT: PAIN_FUNCTIONAL_ASSESSMENT: PREVENTS OR INTERFERES SOME ACTIVE ACTIVITIES AND ADLS

## 2024-11-21 ASSESSMENT — LIFESTYLE VARIABLES: SMOKING_STATUS: 0

## 2024-11-21 NOTE — PROGRESS NOTES
[facility]  Progress Note    Chief complaint :  Chief Complaint   Patient presents with    hgb 3.6     Called by GI last night for labs drawn yesterday.       Subjective:    No overnight problems. Patient describes feeling better. His only complaint is he is feeling queasy in his abdomen.  Patient denies chest pain, SOB, nausea, vomiting, bloody stools, fever, chills, changes in urination. Patient is NPO.  He had 1 episode of stools that were loose yesterday.  He says he was given Pepcid, hyoscyamine, Carafate by his cousin for a period of 10 days during which he felt better.  He wishes to know if he can take these medications in the hospital.    Past medical, surgical, family and social history were reviewed, non-contributory, and unchanged unless otherwise stated.    Review of Systems   Constitutional: Negative.    HENT: Negative.     Eyes: Negative.    Respiratory: Negative.     Cardiovascular: Negative.    Gastrointestinal: Negative.    Endocrine: Negative.    Genitourinary: Negative.    Musculoskeletal: Negative.    Skin: Negative.    Allergic/Immunologic: Negative.    Neurological: Negative.    Hematological: Negative.    Psychiatric/Behavioral: Negative.           Objective:  /82   Pulse 67   Temp 98 °F (36.7 °C) (Oral)   Resp 18   Ht 1.93 m (6' 4\")   Wt 79.4 kg (175 lb)   SpO2 98%   BMI 21.30 kg/m²     Physical Exam  Constitutional:       Appearance: He is not toxic-appearing.   HENT:      Head: Normocephalic.      Nose: Nose normal.      Mouth/Throat:      Mouth: Mucous membranes are moist.   Eyes:      Extraocular Movements: Extraocular movements intact.      Pupils: Pupils are equal, round, and reactive to light.   Cardiovascular:      Rate and Rhythm: Normal rate and regular rhythm.      Pulses: Normal pulses.      Heart sounds: Normal heart sounds. No murmur heard.  Pulmonary:      Effort: Pulmonary effort is normal.      Breath sounds: Normal breath

## 2024-11-21 NOTE — PROGRESS NOTES
Dr Nunez, Dr Diaz, notified of their respective consults via their offices  [facility]

## 2024-11-21 NOTE — OP NOTE
Operative Note      Patient: Ahmet Rodriguez  YOB: 1985  MRN: [id number]    Date of Procedure: 11/21/2024    Pre-Op Diagnosis Codes:      * Anemia, unspecified type [D64.9]    Post-Op Diagnosis: Same       Procedure(s):  ESOPHAGOGASTRODUODENOSCOPY BIOPSY    Surgeon(s):  Kennedy Troncoso MD    Assistant:   * No surgical staff found *    Anesthesia: Monitor Anesthesia Care    Estimated Blood Loss (mL): Minimal    Complications: None    Specimens:   ID Type Source Tests Collected by Time Destination   A : antral bx r/o h pylori Tissue Stomach SURGICAL PATHOLOGY Kennedy Troncoso MD 11/21/2024 1421        Implants:  * No implants in log *      Drains: * No LDAs found *    Findings:  Infection Present At Time Of Surgery (PATOS) (choose all levels that have infection present):  No infection present  Other Findings: none    Detailed Description of Procedure:   After informed consent was obtained, patient was taken to the endoscopy suite and laid in the left lateral decubitus position with head slightly up.  A timeout was observed acknowledging patient, risks and benefits of procedure, type of anesthesia, fire risk, ASA class, and any questions or concerns had by the operating staff which none were had at that time.    Anesthesia induced moderate sedation.  Endoscope was advanced through the oropharynx and the esophagus was intubated.  The scope was advanced under direct visualization into the stomach which was insufflated.  The endoscope was then passed through the pylorus, advanced to the duodenal bulb, into the second portion of the duodenum.  The duodenum in its entirety demonstrated no blood or ulcers.  The endoscope was then slowly advanced to the duodenal bulb and pylorus which were also free of any ulcers.  Stomach was inspected, 3 punctate nonbleeding ulcers were visualized but without signs of active bleeding or clot or visible vessel.  Biopsy x 2 of the antrum was taken and sent to  pathology for H. pylori testing.  The entirety of the stomach was visualized including retroflexion which demonstrated no signs of blood or ulcer.  The stomach was then deinsufflated and the endoscope was retracted back to the gastroesophageal junction which was also visualized and noted to be without ulcers or varices.  The endoscope was removed.  Patient was brought out of anesthesia and taken the PACU in stable condition with no complications observed during this procedure.    Recommendations moving forward given lack of upper GI source, etiology of bleeding now points towards lower GI/colon or possible small bowel, most common being at anastomosis, however that would be atypical this far out from index procedure.  Recommend continuing to observe inpatient with trending of hemoglobin until stable.  If stabilizes okay to undergo colonoscopy outpatient.  If continues to bleed, will discuss further role of colonoscopy while inpatient.    Dr. Kennedy Troncoso MD. MPH  General Surgery  [phone]      Electronically signed by Kennedy Troncoso MD on 11/21/2024 at 2:23 PM

## 2024-11-21 NOTE — CONSULTS
Department of General Surgery - Adult  Surgical Service   Attending Consult Note      Reason for Consult:  Anemia with history of GI bleed    CHIEF COMPLAINT:  fatigue    History Obtained From:  patient    HISTORY OF PRESENT ILLNESS:                The patient is a 39 y.o. male who presents with presented to emergency department after being seen by primary care doc due to hemoglobin of 3.8 on blood test performed.  Patient noted he has been battling with fatigue and lightheadedness with exertional shortness of breath over the course of several months but relates it back to his surgery back in 2015 where he has had daily hematochezia.  Patient also with significant past medical history of thrombin 3 deficiency resulting in chronic anemia, as well as depression.  Seeing and evaluating patient currently denies any acute complaints, denies nausea or vomiting, denies throwing up blood.  Patient denies recent weight loss or weight gain..    Past Medical History:        Diagnosis Date    Chicken pox 1993    Hematochezia     History of coagulopathy 6/4/2014    Hx SBO 6/4/2014     Past Surgical History:        Procedure Laterality Date    APPENDECTOMY      COLON SURGERY  2009    Dr Sheriff     Current Medications:   Current Facility-Administered Medications: sodium chloride flush 0.9 % injection 5-40 mL, 5-40 mL, IntraVENous, 2 times per day  sodium chloride flush 0.9 % injection 5-40 mL, 5-40 mL, IntraVENous, PRN  0.9 % sodium chloride infusion, , IntraVENous, PRN  ondansetron (ZOFRAN-ODT) disintegrating tablet 4 mg, 4 mg, Oral, Q8H PRN **OR** ondansetron (ZOFRAN) injection 4 mg, 4 mg, IntraVENous, Q6H PRN  polyethylene glycol (GLYCOLAX) packet 17 g, 17 g, Oral, Daily PRN  acetaminophen (TYLENOL) tablet 650 mg, 650 mg, Oral, Q6H PRN **OR** acetaminophen (TYLENOL) suppository 650 mg, 650 mg, Rectal, Q6H PRN  pantoprazole (PROTONIX) injection 40 mg, 40 mg, IntraVENous, BID  0.9 % sodium chloride infusion, , IntraVENous, 
Normal pancreas. No evidence of ductal dilatation. Normal adrenal glands. Kidneys normal in size. Kidneys enhance symmetrically and uniformly. No renal calculi.No hydronephrosis. GI/Bowel: Focally dilated small bowel loops, with diameter of 6.5 cm containing air-fluid level, at site of surgical anastomosis (series 303, image 100 through 207, series 603, images 71 through 90). Normal in caliber. Appendix without anomaly.. Pelvis: Normal bladder. No pelvic masses. Peritoneum/Retroperitoneum No free fluid, free air, organized fluid collection. No lymph node enlargement. Ureters normal in course and caliber. Soft tissues: No hernia identified. No edema or masses. Bones: No osteoblastic, and no osteolytic lesions. No degenerative changes. No postoperative changes. No fractures. CTA ABDOMEN: Aorta normal in course and caliber. Ostia of the celiac artery, superior mesenteric artery, single bilateral main renal arteries, and inferior mesenteric artery patent. No area of contrast extravasation identified. CTA PELVIS: Bilateral common, internal, and external iliac arteries patent.  Bilateral common femoral, and ostia of bilateral profundal femoral, and superficial femoral arteries patent. No area contrast extravasation identified.     Focal small bowel dilatation at anastomotic site as described.         ASSESSMENT/PLAN :  39-year-old male   -  2014 for a portal vein thrombosis with a normal PTT / PT, SPE, ROSALEE, SHAVON, Ddimer, V.W antigen, Ristocetin cofactor, protein S, C, thrombophilia tests, LA, cryoglobulin, cryofibrinogen, AT3 64 % [ normal > 83 ], high homocystine 17. He was to start B12 / folate / B6 supplements, recheck AT3, and follow up in 2 months however he was lost to follow up.   - Bowel resection on 2015.     - Hgb of 3.6.   - CT A/P showed focal small bowel dilatation at anastomotic site as described.   - His Hgb in the ED was 3.8 MCV 55, WBC 3.0, ANC preserved. He is s/p 4 units of pRBC's Hgb is now 7.7, MCV

## 2024-11-21 NOTE — ANESTHESIA PRE PROCEDURE
Department of Anesthesiology  Preprocedure Note       Name:  Ahmet Rodriguez   Age:  39 y.o.  :  1985                                          MRN:  [id number]         Date:  2024      Surgeon: Surgeon(s):  Kennedy Troncoso MD    Procedure: Procedure(s):  ESOPHAGOGASTRODUODENOSCOPY    Medications prior to admission:   Prior to Admission medications    Medication Sig Start Date End Date Taking? Authorizing Provider   famotidine (PEPCID) 20 MG tablet Take 1 tablet by mouth 2 times daily 24   Percy Venegas,    hyoscyamine (LEVSIN) 0.125 MG tablet Take 1 tablet by mouth every 4 hours as needed for Cramping 24   Percy Venegas,    sucralfate (CARAFATE) 1 GM tablet Take 1 tablet by mouth 4 times daily 24   Percy Venegas,        Current medications:    Current Facility-Administered Medications   Medication Dose Route Frequency Provider Last Rate Last Admin   • sodium chloride flush 0.9 % injection 5-40 mL  5-40 mL IntraVENous 2 times per day Cassie Altamirano MD   10 mL at 24 1018   • sodium chloride flush 0.9 % injection 5-40 mL  5-40 mL IntraVENous PRN Cassie Altamirano MD       • 0.9 % sodium chloride infusion   IntraVENous PRN Cassie Altamirano MD       • ondansetron (ZOFRAN-ODT) disintegrating tablet 4 mg  4 mg Oral Q8H PRN Cassie Altamirano MD        Or   • ondansetron (ZOFRAN) injection 4 mg  4 mg IntraVENous Q6H PRN Cassie Altamirano MD       • polyethylene glycol (GLYCOLAX) packet 17 g  17 g Oral Daily PRN Cassie Altamirano MD       • acetaminophen (TYLENOL) tablet 650 mg  650 mg Oral Q6H PRN Cassie Altamirano MD        Or   • acetaminophen (TYLENOL) suppository 650 mg  650 mg Rectal Q6H PRN Cassie Altamirano MD       • pantoprazole (PROTONIX) injection 40 mg  40 mg IntraVENous BID Cassie Altamirano MD   40 mg at 24 1018   • 0.9 % sodium chloride infusion   IntraVENous

## 2024-11-21 NOTE — PROGRESS NOTES
4 Eyes Skin Assessment     NAME:  Ahmet Rodriguez  YOB: 1985  MEDICAL RECORD NUMBER:  [id number]    The patient is being assessed for  Admission    I agree that at least one RN has performed a thorough Head to Toe Skin Assessment on the patient. ALL assessment sites listed below have been assessed.      Areas assessed by both nurses:    Head, Face, Ears, Shoulders, Back, Chest, Arms, Elbows, Hands, and Legs. Feet and Heels        Does the Patient have a Wound? No noted wound(s)       Tip Prevention initiated by RN: No  Wound Care Orders initiated by RN: No    Pressure Injury (Stage 3,4, Unstageable, DTI, NWPT, and Complex wounds) if present, place Wound referral order by RN under : No    New Ostomies, if present place, Ostomy referral order under : No     Nurse 1 eSignature: Electronically signed by DELMIS HOWE RN on 11/21/24 at 7:05 AM EST    **SHARE this note so that the co-signing nurse can place an eSignature**    Nurse 2 eSignature: {Esignature:[id number]}

## 2024-11-21 NOTE — CARE COORDINATION
Social Work:    Chart reviewed. Trevor was admitted due to anemia. General surgery and oncolgoy were consulted. Trevor underwent an EGD/BX today. Social service met with Trevor and his significant other, Kristy, advised them about social service role and discussed discharge planning.  Trevor and Kristy reside together with their 8 year old daughter. Trevor is employed and recently obtained insurance. He advises that he was eager to get medical care once the insurance started but did not expect to be hospitalized. Trevor does not presently have a PCP. He had hoped to make an appointment with Kristy's PCP, Dr. Murdock. Social work offered to make the appointment for Trevor and he is agreeable. Trevor expects to discharge home with no needs but he is receptive to recommendations closer to discharge.  Social service to make PCP appt in a.m.    Electronically signed by EMERSON Hernandez on 11/21/2024 at 4:35 PM

## 2024-11-21 NOTE — ANESTHESIA POSTPROCEDURE EVALUATION
Department of Anesthesiology  Postprocedure Note    Patient: Ahmet Rodriguez  MRN: [id number]  YOB: 1985  Date of evaluation: 11/21/2024    Procedure Summary       Date: 11/21/24 Room / Location: [place] [id number] / [facility] [place]    Anesthesia Start: 1412 Anesthesia Stop: 1425    Procedure: ESOPHAGOGASTRODUODENOSCOPY BIOPSY Diagnosis:       Anemia, unspecified type      (Anemia, unspecified type [D64.9])    Surgeons: Kennedy Troncoso MD Responsible Provider: Keo Hobson MD    Anesthesia Type: MAC ASA Status: 2            Anesthesia Type: MAC    Halina Phase I:      Halina Phase II: Halina Score: 9    Anesthesia Post Evaluation    Patient location during evaluation: PACU  Patient participation: complete - patient participated  Level of consciousness: awake  Airway patency: patent  Nausea & Vomiting: no nausea and no vomiting  Cardiovascular status: hemodynamically stable  Respiratory status: acceptable  Hydration status: stable  Pain management: adequate    No notable events documented.

## 2024-11-21 NOTE — PROGRESS NOTES
General Surgery Brief Note:    Contacted by Dr. Nunez's office regarding consult as currently covering for Dr. Nunez.  Case reviewed. Will evaluate patient shortly. Patient tentatively scheduled for EGD today at 14:00 pending clinical evaluation and course.    Dr. Kennedy Troncoso MD. MPH  General Surgery  [phone]

## 2024-11-21 NOTE — PLAN OF CARE
Problem: Gastrointestinal - Adult  Goal: Maintains or returns to baseline bowel function  Outcome: Not Progressing  Goal: Maintains adequate nutritional intake  Outcome: Not Progressing

## 2024-11-22 VITALS
SYSTOLIC BLOOD PRESSURE: 135 MMHG | DIASTOLIC BLOOD PRESSURE: 83 MMHG | RESPIRATION RATE: 18 BRPM | BODY MASS INDEX: 21.04 KG/M2 | TEMPERATURE: 99.6 F | HEART RATE: 73 BPM | OXYGEN SATURATION: 100 % | HEIGHT: 76 IN | WEIGHT: 172.8 LBS

## 2024-11-22 PROBLEM — D62 ACUTE BLOOD LOSS ANEMIA (ABLA): Status: RESOLVED | Noted: 2024-11-20 | Resolved: 2024-11-22

## 2024-11-22 PROBLEM — D50.0 CHRONIC BLOOD LOSS ANEMIA: Status: ACTIVE | Noted: 2024-11-22

## 2024-11-22 PROBLEM — K92.2 ACUTE UPPER GI BLEED: Status: RESOLVED | Noted: 2024-11-20 | Resolved: 2024-11-22

## 2024-11-22 LAB
ALBUMIN SERPL-MCNC: 4.3 G/DL (ref 3.5–5.2)
ALP SERPL-CCNC: 66 U/L (ref 40–129)
ALT SERPL-CCNC: 16 U/L (ref 0–40)
ANION GAP SERPL CALCULATED.3IONS-SCNC: 11 MMOL/L (ref 7–16)
AST SERPL-CCNC: 19 U/L (ref 0–39)
BASOPHILS # BLD: 0.07 K/UL (ref 0–0.2)
BASOPHILS NFR BLD: 2 % (ref 0–2)
BILIRUB SERPL-MCNC: 0.7 MG/DL (ref 0–1.2)
BUN SERPL-MCNC: 12 MG/DL (ref 6–20)
CALCIUM SERPL-MCNC: 9.2 MG/DL (ref 8.6–10.2)
CHLORIDE SERPL-SCNC: 102 MMOL/L (ref 98–107)
CO2 SERPL-SCNC: 22 MMOL/L (ref 22–29)
CREAT SERPL-MCNC: 1 MG/DL (ref 0.7–1.2)
EOSINOPHIL # BLD: 0.16 K/UL (ref 0.05–0.5)
EOSINOPHILS RELATIVE PERCENT: 4 % (ref 0–6)
ERYTHROCYTE [DISTWIDTH] IN BLOOD BY AUTOMATED COUNT: 30.9 % (ref 11.5–15)
FERRITIN SERPL-MCNC: 10 NG/ML
FERRITIN SERPL-MCNC: 4 NG/ML
FOLATE SERPL-MCNC: >20 NG/ML (ref 4.8–24.2)
GFR, ESTIMATED: >90 ML/MIN/1.73M2
GLUCOSE SERPL-MCNC: 82 MG/DL (ref 74–99)
HAPTOGLOB SERPL-MCNC: 53 MG/DL (ref 30–200)
HCT VFR BLD AUTO: 27.5 % (ref 37–54)
HGB BLD-MCNC: 7.8 G/DL (ref 12.5–16.5)
IMM RETICS NFR: 30.5 % (ref 2.3–13.4)
IRON SATN MFR SERPL: 21 % (ref 20–55)
IRON SERPL-MCNC: 99 UG/DL (ref 59–158)
LDH SERPL-CCNC: 136 U/L (ref 135–225)
LYMPHOCYTES NFR BLD: 0.56 K/UL (ref 1.5–4)
LYMPHOCYTES RELATIVE PERCENT: 15 % (ref 20–42)
MCH RBC QN AUTO: 18.1 PG (ref 26–35)
MCHC RBC AUTO-ENTMCNC: 28.4 G/DL (ref 32–34.5)
MCV RBC AUTO: 63.8 FL (ref 80–99.9)
MONOCYTES NFR BLD: 0.26 K/UL (ref 0.1–0.95)
MONOCYTES NFR BLD: 7 % (ref 2–12)
NEUTROPHILS NFR BLD: 72 % (ref 43–80)
NEUTS SEG NFR BLD: 2.75 K/UL (ref 1.8–7.3)
PATH REV BLD -IMP: NORMAL
PLATELET, FLUORESCENCE: 259 K/UL (ref 130–450)
PMV BLD AUTO: ABNORMAL FL (ref 7–12)
POTASSIUM SERPL-SCNC: 3.8 MMOL/L (ref 3.5–5)
PROT SERPL-MCNC: 6.6 G/DL (ref 6.4–8.3)
RBC # BLD AUTO: 4.31 M/UL (ref 3.8–5.8)
RBC # BLD: ABNORMAL 10*6/UL
RETIC HEMOGLOBIN: 14.8 PG (ref 28.2–36.6)
RETICS # AUTO: 0.06 M/UL
RETICS/RBC NFR AUTO: 1.3 % (ref 0.4–1.9)
SEND OUT REPORT: NORMAL
SODIUM SERPL-SCNC: 135 MMOL/L (ref 132–146)
TEST NAME: NORMAL
TIBC SERPL-MCNC: 481 UG/DL (ref 250–450)
VIT B12 SERPL-MCNC: 281 PG/ML (ref 211–946)
WBC OTHER # BLD: 3.8 K/UL (ref 4.5–11.5)

## 2024-11-22 PROCEDURE — 82728 ASSAY OF FERRITIN: CPT

## 2024-11-22 PROCEDURE — 83615 LACTATE (LD) (LDH) ENZYME: CPT

## 2024-11-22 PROCEDURE — 85045 AUTOMATED RETICULOCYTE COUNT: CPT

## 2024-11-22 PROCEDURE — 85025 COMPLETE CBC W/AUTO DIFF WBC: CPT

## 2024-11-22 PROCEDURE — 2580000003 HC RX 258

## 2024-11-22 PROCEDURE — 82746 ASSAY OF FOLIC ACID SERUM: CPT

## 2024-11-22 PROCEDURE — 6360000002 HC RX W HCPCS

## 2024-11-22 PROCEDURE — 82607 VITAMIN B-12: CPT

## 2024-11-22 PROCEDURE — 83010 ASSAY OF HAPTOGLOBIN QUANT: CPT

## 2024-11-22 PROCEDURE — 80053 COMPREHEN METABOLIC PANEL: CPT

## 2024-11-22 RX ORDER — PANTOPRAZOLE SODIUM 40 MG/1
40 TABLET, DELAYED RELEASE ORAL 2 TIMES DAILY
Qty: 60 TABLET | Refills: 0 | Status: SHIPPED | OUTPATIENT
Start: 2024-11-22

## 2024-11-22 RX ADMIN — PANTOPRAZOLE SODIUM 40 MG: 40 INJECTION, POWDER, FOR SOLUTION INTRAVENOUS at 09:09

## 2024-11-22 RX ADMIN — SODIUM CHLORIDE, PRESERVATIVE FREE 10 ML: 5 INJECTION INTRAVENOUS at 09:09

## 2024-11-22 RX ADMIN — SODIUM CHLORIDE, PRESERVATIVE FREE 10 ML: 5 INJECTION INTRAVENOUS at 09:11

## 2024-11-22 NOTE — PROGRESS NOTES
Comprehensive Nutrition Assessment    Type and Reason for Visit:  Initial, Positive nutrition screen (11/20 MST=2;)    Nutrition Recommendations/Plan:   Continue to advance diet as tolerated, when medically feasible  Continue current Clear ONS  Will continue inpatient monitoring     Malnutrition Assessment:  Malnutrition Status:  At risk for malnutrition (11/22/24 1508)    Context:  Acute Illness     Findings of the 6 clinical characteristics of malnutrition:  Energy Intake:  50% or less of estimated energy requirements for 5 or more days  Weight Loss:  Unable to assess     Body Fat Loss:  Unable to assess     Muscle Mass Loss:  Unable to assess    Fluid Accumulation:  No fluid accumulation     Strength:  Not Performed    Nutrition Assessment:    Pt admit 2/2 acute blood loss anemia. H/H 11/20 at OV 3.8/16.1. Currently s/p EGD 11/21 with no source of UGIB identified. Plan for C-scope, poss as outpt. Pt has hx small bowel resect (2014), thrombin III deficiency. Will continue to  monitor pt tolerance to diet progression and status changes.    Nutrition Related Findings:    A&Ox4, soft abd w/+ BS, rectal bleeding, diarrhea, +I/O 1.7L, Wound Type: None       Current Nutrition Intake & Therapies:    Average Meal Intake: 51-75% (on CLD)  Average Supplements Intake: Unable to assess  ADULT ORAL NUTRITION SUPPLEMENT; Breakfast, Lunch, Dinner; Clear Liquid Oral Supplement  ADULT DIET; Clear Liquid; No red dye    Anthropometric Measures:  Height: 193 cm (6' 4\")  Ideal Body Weight (IBW): 202 lbs (92 kg)    Admission Body Weight: 79.4 kg (175 lb) (11/20 bedscale)  Current Body Weight: 78.4 kg (172 lb 13.5 oz), 85.6 % IBW. Weight Source: Bed scale (11/22)  Current BMI (kg/m2): 21  Usual Body Weight:  (ALBERT d/t lack of actual wt hx within 1 yr)                          BMI Categories: Normal Weight (BMI 18.5-24.9)    Estimated Daily Nutrient Needs:  Energy Requirements Based On: Kcal/kg  Weight Used for Energy Requirements:

## 2024-11-22 NOTE — DISCHARGE SUMMARY
Physician Discharge Summary  [facility]     Patient ID:  Ahmet Rodriguez  [id number]  39 y.o.  1985    Admit date: 11/20/2024    Discharge date: 11/22/2024    Admission Diagnoses:   Anemia, unspecified type [D64.9]  Acute blood loss anemia (ABLA) [D62]    Discharge Diagnoses:  Principal Problem (Resolved):    Acute blood loss anemia (ABLA)  Active Problems:    Hematemesis    Gastric ulcer    Anemia    Chronic blood loss anemia  Resolved Problems:    Acute upper GI bleed      Consults: hematology/oncology and general surgery  Procedures:     4 units of blood transfusion 11/20/2024  Esophagogastroduodenoscopy with biopsy for H. pylori by Dr. Kennedy Troncoso done on 11/21/2024    Hospital Course:     Ahmet Rodriguez  is a 39 y.o. male patient of Percy Venegas DO  with a pertinent PMHx of past medical history of coagulation disorder apparently as per medical records thrombin III deficiency, chronic anemia, history of depression, hematochezia, hemetemesis and small bowel resection on 2015 presented to the ED on advice from his PCP office due to low hemoglobin on blood test performed the day before admission.     Patient received 4 units of blood in the ED following which he was shifted to the floors to further workup his anemia.  At time of admission, patient's hemoglobin was found to be 3.8. CTA abdomen pelvis showed focal small bowel dilatation  at anastomotic site as described in the study.  General surgery was consulted and upper GI scope done showed 3 punctate nonbleeding ulcers without signs of active bleeding or clot or visible vessel.  2 biopsies of the antrum were taken and sent for H. pylori testing.  No ulcers or varices noted.     Hematology was consulted in view of anemia advised iron studies, B12 folate levels, studies to rule out PNH which include peripheral smear, haptoglobin, LDH, reticulocyte count, flow cytometry due to the patient having  history of portal vein thrombosis.  The lab values are also a little unsure because it was not known if the sample used for testing was before the blood transfusion or after the blood transfusion.  Results are pending at time of discharge.  The provisional diagnosis for the patient was severe chronic iron deficiency.    Patient was planned to undergo colonoscopy inpatient if he was unstable i.e., there was a drop in hemoglobin.  However over the course of hospital stay since the patient did not have a drop in hemoglobin, it was decided that the patient would follow-up with colonoscopy in an outpatient setting.  Patient was also advised to follow-up with hematology in the outpatient setting for follow-up of his lab results.    Patient was discharged in a stable and improved condition.    Items to follow:  A) PCP - follow-up with lab reports   B) hematology -follow-up with pending lab report  C) General Surgery -colonoscopy outpatient.    Significant Diagnostic Studies:   CTA ABDOMEN PELVIS W CONTRAST   Final Result   Focal small bowel dilatation at anastomotic site as described.            Medication changes: See below    Discharge Exam:  Constitutional:       Appearance: He is not toxic-appearing.   HENT:      Head: Normocephalic.      Nose: Nose normal.      Mouth/Throat:      Mouth: Mucous membranes are moist.   Eyes:      Extraocular Movements: Extraocular movements intact.      Pupils: Pupils are equal, round, and reactive to light.   Cardiovascular:      Rate and Rhythm: Normal rate and regular rhythm.      Pulses: Normal pulses.      Heart sounds: Normal heart sounds. No murmur heard.  Pulmonary:      Effort: Pulmonary effort is normal.      Breath sounds: Normal breath sounds.   Abdominal:      Palpations: Abdomen is soft.   Musculoskeletal:         General: Normal range of motion.      Cervical back: Normal range of motion and neck supple.   Skin:     General: Skin is warm.      Capillary Refill: Capillary

## 2024-11-22 NOTE — PROGRESS NOTES
[address] [place] [address] center  Hematology/Oncology  Consult      Patient Name: Ahmet Rodriguez  YOB: 1985  PCP: Percy Venegas DO   Referring Provider:      Reason for Consultation:   Chief Complaint   Patient presents with    hgb 3.6     Called by GI last night for labs drawn yesterday.        History of Present Illness: This is a 39-year-old male patient who was followed by our service in 2014 for a portal vein thrombosis with a normal PTT / PT, SPE, ROSALEE, SHAVON, Ddimer, V.W antigen, Ristocetin cofactor, protein S, C, thrombophilia tests, LA, cryoglobulin, cryofibrinogen, AT3 64 % [ normal > 83 ], high homocystine 17. He was to start B12 / folate / B6 supplements, recheck AT3, and follow up in 2 months however he was lost to follow up. He also has a PMH of a small bowel resection on 2015. Patient presented to the ED for evaluation from PCP's office for an Hgb of 3.6. CT A/P showed focal small bowel dilatation at anastomotic site as described. His Hgb in the ED was 3.8 MCV 55, WBC 3.0, ANC preserved. He is s/p 4 units of pRBC's Hgb is now 7.7, MCV 63.1. Folate >20., 293. Iron profile pending. CMP and LFT's are unremarkable. Consultation for severe anemia.    Diagnostic Data:     Past Medical History:   Diagnosis Date    Chicken pox 1993    Hematochezia     History of coagulopathy 6/4/2014    Hx SBO 6/4/2014       Patient Active Problem List    Diagnosis Date Noted    Acute blood loss anemia (ABLA) 11/20/2024    Anemia 11/20/2024    Acute upper GI bleed 11/20/2024    Gastric ulcer 05/26/2015    History of coagulopathy 06/04/2014    Hx SBO 06/04/2014    Hematemesis 06/03/2014    Ileus (HCC) 06/03/2014    Passage of bloody stools 06/03/2014    Hypoglycemia 06/03/2014        Past Surgical History:   Procedure Laterality Date    APPENDECTOMY      COLON SURGERY  2009    Dr Sheriff    UPPER GASTROINTESTINAL ENDOSCOPY N/A 11/21/2024    ESOPHAGOGASTRODUODENOSCOPY BIOPSY performed by Kennedy Troncoso  extrahepatic ductal dilatation. Gallbladder without calculi, wall thickening, para cholecystic fluid. Spleen is normal in size.No splenic masses or calcification identified. Normal pancreas. No evidence of ductal dilatation. Normal adrenal glands. Kidneys normal in size. Kidneys enhance symmetrically and uniformly. No renal calculi.No hydronephrosis. GI/Bowel: Focally dilated small bowel loops, with diameter of 6.5 cm containing air-fluid level, at site of surgical anastomosis (series 303, image 100 through 207, series 603, images 71 through 90). Normal in caliber. Appendix without anomaly.. Pelvis: Normal bladder. No pelvic masses. Peritoneum/Retroperitoneum No free fluid, free air, organized fluid collection. No lymph node enlargement. Ureters normal in course and caliber. Soft tissues: No hernia identified. No edema or masses. Bones: No osteoblastic, and no osteolytic lesions. No degenerative changes. No postoperative changes. No fractures. CTA ABDOMEN: Aorta normal in course and caliber. Ostia of the celiac artery, superior mesenteric artery, single bilateral main renal arteries, and inferior mesenteric artery patent. No area of contrast extravasation identified. CTA PELVIS: Bilateral common, internal, and external iliac arteries patent.  Bilateral common femoral, and ostia of bilateral profundal femoral, and superficial femoral arteries patent. No area contrast extravasation identified.     Focal small bowel dilatation at anastomotic site as described.         ASSESSMENT/PLAN :  39-year-old male   -  2014 for a portal vein thrombosis with a normal PTT / PT, SPE, ROSALEE, SHAVON, Ddimer, V.W antigen, Ristocetin cofactor, protein S, C, thrombophilia tests, LA, cryoglobulin, cryofibrinogen, AT3 64 % [ normal > 83 ], high homocystine 17. He was to start B12 / folate / B6 supplements, recheck AT3, and follow up in 2 months however he was lost to follow up.   - Bowel resection on 2015.     - Hgb of 3.6.   - CT A/P showed

## 2024-11-22 NOTE — PLAN OF CARE
Problem: Discharge Planning  Goal: Discharge to home or other facility with appropriate resources  11/22/2024 1034 by Tarah Carter RN  Outcome: Progressing  11/22/2024 0640 by Ruddy Calvillo RN  Outcome: Progressing     Problem: Safety - Adult  Goal: Free from fall injury  11/22/2024 1034 by Tarah Carter RN  Outcome: Progressing  11/22/2024 0640 by Ruddy Calvillo RN  Outcome: Progressing     Problem: Cardiovascular - Adult  Goal: Maintains optimal cardiac output and hemodynamic stability  11/22/2024 1034 by Tarah Carter RN  Outcome: Progressing  11/22/2024 0640 by Ruddy Calvillo RN  Outcome: Progressing  Goal: Absence of cardiac dysrhythmias or at baseline  11/22/2024 1034 by Tarah Carter RN  Outcome: Progressing  11/22/2024 0640 by Ruddy Calvillo RN  Outcome: Progressing     Problem: Gastrointestinal - Adult  Goal: Minimal or absence of nausea and vomiting  11/22/2024 1034 by Tarah Carter RN  Outcome: Progressing  11/22/2024 0640 by Ruddy Calvillo RN  Outcome: Progressing  Goal: Maintains or returns to baseline bowel function  11/22/2024 1034 by Tarah Carter RN  Outcome: Progressing  11/22/2024 0640 by Ruddy Calvillo, RN  Outcome: Progressing  Goal: Maintains adequate nutritional intake  11/22/2024 1034 by Tarah Carter RN  Outcome: Progressing  11/22/2024 0640 by Ruddy Calvillo RN  Outcome: Progressing     Problem: Hematologic - Adult  Goal: Maintains hematologic stability  11/22/2024 1034 by Tarah Carter RN  Outcome: Progressing  11/22/2024 0640 by Ruddy Calvillo RN  Outcome: Progressing

## 2024-11-25 LAB
SEND OUT REPORT: NORMAL
TEST NAME: NORMAL

## 2024-11-26 ENCOUNTER — TELEPHONE (OUTPATIENT)
Dept: PRIMARY CARE CLINIC | Age: 39
End: 2024-11-26

## 2024-11-26 NOTE — TELEPHONE ENCOUNTER
Care Transitions Initial Follow Up Call    Outreach made within 2 business days of discharge: Yes    Patient: Ahmet Rodriguez Patient : 1985   MRN: [id number]  Reason for Admission: Acute blood loss anemia  Discharge Date: 24       Spoke with: Patient    Discharge department/facility: [facility] Interactive Patient Contact:  Was patient able to fill all prescriptions: Yes  Was patient instructed to bring all medications to the follow-up visit: Yes  Is patient taking all medications as directed in the discharge summary? Yes  Does patient understand their discharge instructions: Yes  Does patient have questions or concerns that need addressed prior to 7-14 day follow up office visit: no    Additional needs identified to be addressed with provider               Scheduled appointment with PCP within 7-14 days    Follow Up  Future Appointments   Date Time Provider Department Center   12/3/2024  6:45 AM Percy Venegas DO [facility]   2025  2:15 PM Percy Venegas DO [facility] DEP       Antonia Martinez MA

## 2024-11-27 LAB — SURGICAL PATHOLOGY REPORT: NORMAL

## 2024-12-02 NOTE — PROGRESS NOTES
initiated 2 business days of discharge: Yes    ASSESSMENT/PLAN:   Hospital discharge follow-up  -     CBC  -     [place] DISCHARGE MEDS RECONCILED W/ CURRENT OUTPATIENT MED LIST  -     ferrous sulfate (IRON 325) 325 (65 Fe) MG tablet; Take 1 tablet by mouth daily (with breakfast), Disp-90 tablet, R-1Normal  Chronic blood loss anemia  -     CBC  -     ferrous sulfate (IRON 325) 325 (65 Fe) MG tablet; Take 1 tablet by mouth daily (with breakfast), Disp-90 tablet, R-1Normal  Iron deficiency anemia due to chronic blood loss  -     CBC  -     ferrous sulfate (IRON 325) 325 (65 Fe) MG tablet; Take 1 tablet by mouth daily (with breakfast), Disp-90 tablet, R-1Normal      Medical Decision Making: moderate complexity  Return in about 4 weeks (around 12/31/2024) for f/u GIB and DWIGHT .    On this date 12/3/2024 I have spent 11 minutes reviewing previous notes, test results and face to face with the patient discussing the diagnosis and importance of compliance with the treatment plan as well as documenting on the day of the visit.       Patient Active Problem List   Diagnosis    Hematemesis    Ileus (HCC)    Passage of bloody stools    Hypoglycemia    History of coagulopathy    Hx SBO    Gastric ulcer    Anemia    Chronic blood loss anemia       Medications listed as ordered at the time of discharge from hospital     Medication List            Accurate as of December 3, 2024  6:56 AM. If you have any questions, ask your nurse or doctor.                START taking these medications      ferrous sulfate 325 (65 Fe) MG tablet  Commonly known as: IRON 325  Take 1 tablet by mouth daily (with breakfast)  Started by: Dr. Percy Venegas, DO            CONTINUE taking these medications      hyoscyamine 0.125 MG tablet  Commonly known as: Levsin  Take 1 tablet by mouth every 4 hours as needed for Cramping     pantoprazole 40 MG tablet  Commonly known as: PROTONIX  Take 1 tablet by mouth in the morning and at bedtime     sucralfate 1 GM

## 2024-12-03 ENCOUNTER — OFFICE VISIT (OUTPATIENT)
Dept: PRIMARY CARE CLINIC | Age: 39
End: 2024-12-03
Payer: COMMERCIAL

## 2024-12-03 VITALS
WEIGHT: 198 LBS | BODY MASS INDEX: 24.11 KG/M2 | DIASTOLIC BLOOD PRESSURE: 68 MMHG | SYSTOLIC BLOOD PRESSURE: 122 MMHG | HEART RATE: 76 BPM | OXYGEN SATURATION: 100 % | HEIGHT: 76 IN | RESPIRATION RATE: 17 BRPM | TEMPERATURE: 97.8 F

## 2024-12-03 DIAGNOSIS — D50.0 IRON DEFICIENCY ANEMIA DUE TO CHRONIC BLOOD LOSS: ICD-10-CM

## 2024-12-03 DIAGNOSIS — Z09 HOSPITAL DISCHARGE FOLLOW-UP: Primary | ICD-10-CM

## 2024-12-03 DIAGNOSIS — D50.0 CHRONIC BLOOD LOSS ANEMIA: ICD-10-CM

## 2024-12-03 DIAGNOSIS — R10.84 GENERALIZED ABDOMINAL PAIN: ICD-10-CM

## 2024-12-03 LAB
HCT VFR BLD CALC: 29.5 % (ref 37–54)
HEMOGLOBIN: 7.5 G/DL (ref 12.5–16.5)
MCH RBC QN AUTO: 17.9 PG (ref 26–35)
MCHC RBC AUTO-ENTMCNC: 25.4 G/DL (ref 32–34.5)
MCV RBC AUTO: 70.2 FL (ref 80–99.9)
PDW BLD-RTO: 33.2 % (ref 11.5–15)
PLATELET # BLD: 564 K/UL (ref 130–450)
PMV BLD AUTO: 10 FL (ref 7–12)
RBC # BLD: 4.2 M/UL (ref 3.8–5.8)
WBC # BLD: 4.7 K/UL (ref 4.5–11.5)

## 2024-12-03 PROCEDURE — 1111F DSCHRG MED/CURRENT MED MERGE: CPT | Performed by: STUDENT IN AN ORGANIZED HEALTH CARE EDUCATION/TRAINING PROGRAM

## 2024-12-03 PROCEDURE — 99214 OFFICE O/P EST MOD 30 MIN: CPT | Performed by: STUDENT IN AN ORGANIZED HEALTH CARE EDUCATION/TRAINING PROGRAM

## 2024-12-03 RX ORDER — HYOSCYAMINE SULFATE 0.125 MG
0.12 TABLET ORAL EVERY 4 HOURS PRN
Qty: 90 TABLET | Refills: 3 | Status: SHIPPED | OUTPATIENT
Start: 2024-12-03

## 2024-12-03 RX ORDER — FERROUS SULFATE 325(65) MG
325 TABLET ORAL
Qty: 90 TABLET | Refills: 1 | Status: SHIPPED | OUTPATIENT
Start: 2024-12-03

## 2024-12-16 ENCOUNTER — HOSPITAL ENCOUNTER (OUTPATIENT)
Age: 39
Discharge: HOME | End: 2024-12-18

## 2024-12-16 PROCEDURE — 88304 TISSUE EXAM BY PATHOLOGIST: CPT

## 2024-12-19 LAB — SURGICAL PATHOLOGY REPORT: NORMAL

## 2024-12-23 DIAGNOSIS — R10.84 GENERALIZED ABDOMINAL PAIN: ICD-10-CM

## 2024-12-23 RX ORDER — SUCRALFATE 1 G/1
1 TABLET ORAL 4 TIMES DAILY
Qty: 120 TABLET | Refills: 3 | Status: SHIPPED | OUTPATIENT
Start: 2024-12-23

## 2024-12-23 RX ORDER — HYOSCYAMINE SULFATE 0.125 MG
0.12 TABLET ORAL EVERY 4 HOURS PRN
Qty: 90 TABLET | Refills: 3 | Status: SHIPPED | OUTPATIENT
Start: 2024-12-23

## 2025-01-05 ASSESSMENT — PATIENT HEALTH QUESTIONNAIRE - PHQ9
4. FEELING TIRED OR HAVING LITTLE ENERGY: NOT AT ALL
SUM OF ALL RESPONSES TO PHQ QUESTIONS 1-9: 0
SUM OF ALL RESPONSES TO PHQ9 QUESTIONS 1 & 2: 0
SUM OF ALL RESPONSES TO PHQ QUESTIONS 1-9: 0
1. LITTLE INTEREST OR PLEASURE IN DOING THINGS: NOT AT ALL
2. FEELING DOWN, DEPRESSED OR HOPELESS: NOT AT ALL
10. IF YOU CHECKED OFF ANY PROBLEMS, HOW DIFFICULT HAVE THESE PROBLEMS MADE IT FOR YOU TO DO YOUR WORK, TAKE CARE OF THINGS AT HOME, OR GET ALONG WITH OTHER PEOPLE: NOT DIFFICULT AT ALL
SUM OF ALL RESPONSES TO PHQ QUESTIONS 1-9: 0
SUM OF ALL RESPONSES TO PHQ9 QUESTIONS 1 & 2: 0
9. THOUGHTS THAT YOU WOULD BE BETTER OFF DEAD, OR OF HURTING YOURSELF: NOT AT ALL
SUM OF ALL RESPONSES TO PHQ QUESTIONS 1-9: 0
9. THOUGHTS THAT YOU WOULD BE BETTER OFF DEAD, OR OF HURTING YOURSELF: NOT AT ALL
8. MOVING OR SPEAKING SO SLOWLY THAT OTHER PEOPLE COULD HAVE NOTICED. OR THE OPPOSITE - BEING SO FIDGETY OR RESTLESS THAT YOU HAVE BEEN MOVING AROUND A LOT MORE THAN USUAL: NOT AT ALL
6. FEELING BAD ABOUT YOURSELF - OR THAT YOU ARE A FAILURE OR HAVE LET YOURSELF OR YOUR FAMILY DOWN: NOT AT ALL
2. FEELING DOWN, DEPRESSED OR HOPELESS: NOT AT ALL
6. FEELING BAD ABOUT YOURSELF - OR THAT YOU ARE A FAILURE OR HAVE LET YOURSELF OR YOUR FAMILY DOWN: NOT AT ALL
7. TROUBLE CONCENTRATING ON THINGS, SUCH AS READING THE NEWSPAPER OR WATCHING TELEVISION: NOT AT ALL
3. TROUBLE FALLING OR STAYING ASLEEP: NOT AT ALL
10. IF YOU CHECKED OFF ANY PROBLEMS, HOW DIFFICULT HAVE THESE PROBLEMS MADE IT FOR YOU TO DO YOUR WORK, TAKE CARE OF THINGS AT HOME, OR GET ALONG WITH OTHER PEOPLE: NOT DIFFICULT AT ALL
7. TROUBLE CONCENTRATING ON THINGS, SUCH AS READING THE NEWSPAPER OR WATCHING TELEVISION: NOT AT ALL
SUM OF ALL RESPONSES TO PHQ QUESTIONS 1-9: 0
5. POOR APPETITE OR OVEREATING: NOT AT ALL
1. LITTLE INTEREST OR PLEASURE IN DOING THINGS: NOT AT ALL
5. POOR APPETITE OR OVEREATING: NOT AT ALL
4. FEELING TIRED OR HAVING LITTLE ENERGY: NOT AT ALL
3. TROUBLE FALLING OR STAYING ASLEEP: NOT AT ALL
8. MOVING OR SPEAKING SO SLOWLY THAT OTHER PEOPLE COULD HAVE NOTICED. OR THE OPPOSITE, BEING SO FIGETY OR RESTLESS THAT YOU HAVE BEEN MOVING AROUND A LOT MORE THAN USUAL: NOT AT ALL

## 2025-01-07 NOTE — PROGRESS NOTES
Abdominal:      General: Bowel sounds are normal. There is no distension.      Palpations: Abdomen is soft.      Tenderness: There is no abdominal tenderness.   Musculoskeletal:         General: No tenderness.      Cervical back: Normal range of motion and neck supple.   Skin:     General: Skin is warm and dry.   Neurological:      Mental Status: He is alert.   Psychiatric:         Behavior: Behavior normal.             Assessment and Plan       1. S/P hemorrhoidectomy  F/u  -Doing well, no blood in stools, will get labs to check on hemoglobin level, other wise symptoms have resolved which is great  - CBC    2. Anemia, unspecified type  As above  - CBC          I am the primary care provider for this patient and provide the patient with continuity of care.           Counseled regarding above diagnosis, including possible risks and complications,  especially if left uncontrolled. Counseled regarding the possible side effects, risks, benefits and alternatives to treatment;patient and/or guardian verbalizes understanding, agrees, feels comfortable with and wishes to proceed with above treatment plan.    Call or go to EDimmediately if symptoms worsen or persist. Advised patient to call with any new medication issues, and, as applicable, read all Rx info from pharmacy to assure aware of all possible risks and side effects of medicationbefore taking.     Patient and/or guardian given opportunity to ask questions/raise concerns.  The patient verbalized comfort and understanding ofinstructions.    I encourage further reading and education about your health conditions.  Information on many health conditions is provided by theAmerican Academy of Family Physicians: https://familydoctor.org/  Please bring any questions to me at your nextvisit.    Return to Office: Return in about 6 months (around 7/8/2025) for annual physical .    Medication List:    Current Outpatient Medications   Medication Sig Dispense Refill

## 2025-01-08 ENCOUNTER — OFFICE VISIT (OUTPATIENT)
Dept: PRIMARY CARE CLINIC | Age: 40
End: 2025-01-08
Payer: COMMERCIAL

## 2025-01-08 VITALS
OXYGEN SATURATION: 99 % | HEIGHT: 76 IN | BODY MASS INDEX: 22.04 KG/M2 | TEMPERATURE: 97.5 F | SYSTOLIC BLOOD PRESSURE: 118 MMHG | DIASTOLIC BLOOD PRESSURE: 72 MMHG | WEIGHT: 181 LBS | RESPIRATION RATE: 16 BRPM | HEART RATE: 91 BPM

## 2025-01-08 DIAGNOSIS — Z98.890 S/P HEMORRHOIDECTOMY: Primary | ICD-10-CM

## 2025-01-08 DIAGNOSIS — Z87.19 S/P HEMORRHOIDECTOMY: Primary | ICD-10-CM

## 2025-01-08 DIAGNOSIS — D64.9 ANEMIA, UNSPECIFIED TYPE: ICD-10-CM

## 2025-01-08 LAB
HCT VFR BLD CALC: 39.1 % (ref 37–54)
HEMOGLOBIN: 11.6 G/DL (ref 12.5–16.5)
MCH RBC QN AUTO: 24.1 PG (ref 26–35)
MCHC RBC AUTO-ENTMCNC: 29.7 G/DL (ref 32–34.5)
MCV RBC AUTO: 81.1 FL (ref 80–99.9)
PDW BLD-RTO: 30 % (ref 11.5–15)
PLATELET, FLUORESCENCE: 438 K/UL (ref 130–450)
PMV BLD AUTO: ABNORMAL FL (ref 7–12)
RBC # BLD: 4.82 M/UL (ref 3.8–5.8)
WBC # BLD: 5 K/UL (ref 4.5–11.5)

## 2025-01-08 PROCEDURE — 99213 OFFICE O/P EST LOW 20 MIN: CPT | Performed by: STUDENT IN AN ORGANIZED HEALTH CARE EDUCATION/TRAINING PROGRAM

## 2025-01-19 DIAGNOSIS — R10.84 GENERALIZED ABDOMINAL PAIN: ICD-10-CM

## 2025-01-20 RX ORDER — HYOSCYAMINE SULFATE 0.125 MG
0.12 TABLET ORAL EVERY 4 HOURS PRN
Qty: 90 TABLET | Refills: 3 | Status: SHIPPED | OUTPATIENT
Start: 2025-01-20

## 2025-02-20 ENCOUNTER — OFFICE VISIT (OUTPATIENT)
Dept: PRIMARY CARE CLINIC | Age: 40
End: 2025-02-20

## 2025-02-20 VITALS
HEART RATE: 83 BPM | HEIGHT: 76 IN | SYSTOLIC BLOOD PRESSURE: 122 MMHG | RESPIRATION RATE: 17 BRPM | OXYGEN SATURATION: 99 % | DIASTOLIC BLOOD PRESSURE: 74 MMHG | BODY MASS INDEX: 22.11 KG/M2 | TEMPERATURE: 97.8 F | WEIGHT: 181.6 LBS

## 2025-02-20 DIAGNOSIS — R10.84 GENERALIZED ABDOMINAL PAIN: Primary | ICD-10-CM

## 2025-02-20 RX ORDER — FAMOTIDINE 20 MG/1
TABLET, FILM COATED ORAL
COMMUNITY
Start: 2025-02-19

## 2025-02-20 NOTE — PROGRESS NOTES
[facility] [place] Care  Department of Family Medicine      Patient:  Ahmet Rodriguez 39 y.o. male     Date of Service: 2/20/25      Chief complaint:   Chief Complaint   Patient presents with    Abdominal Pain         History ofPresent Illness   The patient is a 39 y.o. male  presented to the clinic with complaints as above.    Abdominal pain  -f/u  -resolved however still having issues with needing to have bowel movement whenever he eats   -otherwise feeling well, sleeping well and feels great   -no blood in stools     Past Medical History:      Diagnosis Date    Chicken pox 1993    Hematochezia     History of coagulopathy 6/4/2014    Hx SBO 6/4/2014       PastSurgical History:        Procedure Laterality Date    APPENDECTOMY      COLON SURGERY  2009    Dr Sheriff    UPPER GASTROINTESTINAL ENDOSCOPY N/A 11/21/2024    ESOPHAGOGASTRODUODENOSCOPY BIOPSY performed by Kennedy Troncoso MD at [facility]       Allergies:    Bee venom and Poison oak extract    Social History:   Social History     Socioeconomic History    Marital status: Single     Spouse name: Not on file    Number of children: Not on file    Years of education: Not on file    Highest education level: Not on file   Occupational History    Not on file   Tobacco Use    Smoking status: Former     Current packs/day: 0.25     Types: Cigarettes    Smokeless tobacco: Never    Tobacco comments:     quit 1/1/15   Substance and Sexual Activity    Alcohol use: No    Drug use: Yes     Frequency: 7.0 times per week     Types: Marijuana (Weed)     Comment: daily    Sexual activity: Not on file   Other Topics Concern    Not on file   Social History Narrative    Not on file     Social Determinants of Health     Financial Resource Strain: Low Risk  (11/19/2024)    Overall Financial Resource Strain (CARDIA)     Difficulty of Paying Living Expenses: Not hard at all   Food Insecurity: No Food Insecurity (11/22/2024)    Hunger Vital Sign     Worried

## 2025-03-19 RX ORDER — FAMOTIDINE 20 MG/1
20 TABLET, FILM COATED ORAL 2 TIMES DAILY
Qty: 60 TABLET | Refills: 2 | Status: SHIPPED | OUTPATIENT
Start: 2025-03-19

## 2025-03-19 NOTE — TELEPHONE ENCOUNTER
Name of Medication(s) Requested:  Requested Prescriptions     Pending Prescriptions Disp Refills    famotidine (PEPCID) 20 MG tablet [Pharmacy Med Name: FAMOTIDINE 20 MG TABLET] 60 tablet 2     Sig: TAKE ONE TABLET BY MOUTH 2 TIMES A DAY       Medication is on current medication list Yes    Dosage and directions were verified? Yes    Quantity verified: 30 day supply     Pharmacy Verified?  Yes    Last Appointment:  2/20/2025    Future appts:  Future Appointments   Date Time Provider Department Center   7/8/2025  6:45 AM Percy Venegas, DO [facility] DEP   8/21/2025  2:15 PM Percy Venegas, DO [facility] DE[facility]        (If no appt send self scheduling link. .REFILLAPPT)  Scheduling request sent?     [] Yes  [x] No    Does patient need updated?  [] Yes  [x] No

## 2025-06-09 RX ORDER — FAMOTIDINE 20 MG/1
20 TABLET, FILM COATED ORAL 2 TIMES DAILY
Qty: 60 TABLET | Refills: 2 | Status: SHIPPED | OUTPATIENT
Start: 2025-06-09

## 2025-06-09 NOTE — TELEPHONE ENCOUNTER
Name of Medication(s) Requested:  Requested Prescriptions     Pending Prescriptions Disp Refills    famotidine (PEPCID) 20 MG tablet [Pharmacy Med Name: FAMOTIDINE 20 MG TABLET] 60 tablet 0     Sig: TAKE ONE TABLET BY MOUTH 2 TIMES A DAY       Medication is on current medication list Yes    Dosage and directions were verified? Yes    Quantity verified: 30 day supply     Pharmacy Verified?  Yes    Last Appointment:  2/20/2025    Future appts:  Future Appointments   Date Time Provider Department Center   7/8/2025  6:45 AM Percy Venegas, DO [facility] DEP   8/21/2025  2:15 PM Percy Venegas, DO [facility] DE[facility]        (If no appt send self scheduling link. .REFILLAPPT)  Scheduling request sent?     [] Yes  [x] No    Does patient need updated?  [] Yes  [x] No

## 2025-07-08 ENCOUNTER — OFFICE VISIT (OUTPATIENT)
Dept: PRIMARY CARE CLINIC | Age: 40
End: 2025-07-08
Payer: COMMERCIAL

## 2025-07-08 VITALS
BODY MASS INDEX: 21.8 KG/M2 | DIASTOLIC BLOOD PRESSURE: 70 MMHG | SYSTOLIC BLOOD PRESSURE: 122 MMHG | OXYGEN SATURATION: 98 % | HEIGHT: 76 IN | WEIGHT: 179 LBS | HEART RATE: 78 BPM | RESPIRATION RATE: 14 BRPM | TEMPERATURE: 98 F

## 2025-07-08 DIAGNOSIS — Z00.00 ANNUAL PHYSICAL EXAM: Primary | ICD-10-CM

## 2025-07-08 DIAGNOSIS — K21.9 GASTROESOPHAGEAL REFLUX DISEASE, UNSPECIFIED WHETHER ESOPHAGITIS PRESENT: ICD-10-CM

## 2025-07-08 LAB
ALBUMIN: 4.3 G/DL (ref 3.5–5.2)
ALP BLD-CCNC: 66 U/L (ref 40–129)
ALT SERPL-CCNC: 23 U/L (ref 0–50)
ANION GAP SERPL CALCULATED.3IONS-SCNC: 10 MMOL/L (ref 7–16)
AST SERPL-CCNC: 23 U/L (ref 0–50)
BILIRUB SERPL-MCNC: 0.4 MG/DL (ref 0–1.2)
BUN BLDV-MCNC: 19 MG/DL (ref 6–20)
CALCIUM SERPL-MCNC: 9.3 MG/DL (ref 8.6–10)
CHLORIDE BLD-SCNC: 106 MMOL/L (ref 98–107)
CHOLESTEROL, TOTAL: 139 MG/DL
CO2: 26 MMOL/L (ref 22–29)
CREAT SERPL-MCNC: 1 MG/DL (ref 0.7–1.2)
GFR, ESTIMATED: >90 ML/MIN/1.73M2
GLUCOSE BLD-MCNC: 97 MG/DL (ref 74–99)
HCT VFR BLD CALC: 45.8 % (ref 37–54)
HDLC SERPL-MCNC: 51 MG/DL
HEMOGLOBIN: 15 G/DL (ref 12.5–16.5)
LDL CHOLESTEROL: 68 MG/DL
MCH RBC QN AUTO: 30 PG (ref 26–35)
MCHC RBC AUTO-ENTMCNC: 32.8 G/DL (ref 32–34.5)
MCV RBC AUTO: 91.6 FL (ref 80–99.9)
PDW BLD-RTO: 13.2 % (ref 11.5–15)
PLATELET # BLD: 279 K/UL (ref 130–450)
PMV BLD AUTO: 10.9 FL (ref 7–12)
POTASSIUM SERPL-SCNC: 3.8 MMOL/L (ref 3.5–5.1)
RBC # BLD: 5 M/UL (ref 3.8–5.8)
SODIUM BLD-SCNC: 142 MMOL/L (ref 136–145)
TOTAL PROTEIN: 6.8 G/DL (ref 6.4–8.3)
TRIGL SERPL-MCNC: 102 MG/DL
VLDLC SERPL CALC-MCNC: 20 MG/DL
WBC # BLD: 5.1 K/UL (ref 4.5–11.5)

## 2025-07-08 PROCEDURE — 99395 PREV VISIT EST AGE 18-39: CPT | Performed by: STUDENT IN AN ORGANIZED HEALTH CARE EDUCATION/TRAINING PROGRAM

## 2025-07-08 PROCEDURE — 36415 COLL VENOUS BLD VENIPUNCTURE: CPT | Performed by: STUDENT IN AN ORGANIZED HEALTH CARE EDUCATION/TRAINING PROGRAM

## 2025-07-08 RX ORDER — PANTOPRAZOLE SODIUM 40 MG/1
40 TABLET, DELAYED RELEASE ORAL
Qty: 30 TABLET | Refills: 3 | Status: SHIPPED | OUTPATIENT
Start: 2025-07-08

## 2025-07-08 NOTE — PROGRESS NOTES
Not hard at all   Food Insecurity: No Food Insecurity (11/22/2024)    Hunger Vital Sign     Worried About Running Out of Food in the Last Year: Never true     Ran Out of Food in the Last Year: Never true   Transportation Needs: No Transportation Needs (11/22/2024)    PRAPARE - Transportation     Lack of Transportation (Medical): No     Lack of Transportation (Non-Medical): No   Physical Activity: Unknown (11/19/2024)    Exercise Vital Sign     Days of Exercise per Week: 5 days     Minutes of Exercise per Session: Not on file   Stress: Not on file   Social Connections: Not on file   Intimate Partner Violence: Not on file   Housing Stability: Low Risk  (11/22/2024)    Housing Stability Vital Sign     Unable to Pay for Housing in the Last Year: No     Number of Times Moved in the Last Year: 1     Homeless in the Last Year: No        Family History:       Problem Relation Age of Onset    High Blood Pressure Father     Diabetes Father     Heart Disease Paternal Grandmother     Stroke Paternal Grandmother     Diabetes Paternal Grandmother     High Blood Pressure Paternal Grandmother     Heart Disease Paternal Grandfather     High Blood Pressure Paternal Grandfather        Review of Systems:   Review of Systems - as above     Physical Exam   Vitals: /70   Pulse 78   Temp 98 °F (36.7 °C) (Infrared)   Resp 14   Ht 1.93 m (6' 4\")   Wt 81.2 kg (179 lb)   SpO2 98%   BMI 21.79 kg/m²   Physical Exam  Constitutional:       Appearance: He is well-developed.   HENT:      Head: Normocephalic and atraumatic.   Eyes:      General:         Right eye: No discharge.         Left eye: No discharge.      Conjunctiva/sclera: Conjunctivae normal.   Neck:      Trachea: No tracheal deviation.   Cardiovascular:      Rate and Rhythm: Normal rate and regular rhythm.      Heart sounds: Normal heart sounds.   Pulmonary:      Effort: Pulmonary effort is normal. No respiratory distress.      Breath sounds: Normal breath sounds. No

## 2025-08-10 DIAGNOSIS — R10.84 GENERALIZED ABDOMINAL PAIN: ICD-10-CM

## 2025-08-11 RX ORDER — HYOSCYAMINE SULFATE 0.12 MG/1
TABLET ORAL
Qty: 90 TABLET | Refills: 0 | Status: SHIPPED | OUTPATIENT
Start: 2025-08-11

## (undated) DEVICE — FORCEPS 2.2MMX160CM BX LRG